# Patient Record
Sex: FEMALE | Race: WHITE | NOT HISPANIC OR LATINO | Employment: UNEMPLOYED | URBAN - METROPOLITAN AREA
[De-identification: names, ages, dates, MRNs, and addresses within clinical notes are randomized per-mention and may not be internally consistent; named-entity substitution may affect disease eponyms.]

---

## 2017-01-04 ENCOUNTER — OFFICE VISIT (OUTPATIENT)
Dept: FAMILY MEDICINE CLINIC | Facility: CLINIC | Age: 47
End: 2017-01-04

## 2017-01-04 ENCOUNTER — TELEPHONE (OUTPATIENT)
Dept: FAMILY MEDICINE CLINIC | Facility: CLINIC | Age: 47
End: 2017-01-04

## 2017-01-04 VITALS
HEART RATE: 84 BPM | DIASTOLIC BLOOD PRESSURE: 86 MMHG | SYSTOLIC BLOOD PRESSURE: 142 MMHG | BODY MASS INDEX: 32.14 KG/M2 | HEIGHT: 66 IN | WEIGHT: 200 LBS

## 2017-01-04 DIAGNOSIS — F32.89 OTHER DEPRESSION: ICD-10-CM

## 2017-01-04 DIAGNOSIS — M54.42 CHRONIC BILATERAL LOW BACK PAIN WITH LEFT-SIDED SCIATICA: ICD-10-CM

## 2017-01-04 DIAGNOSIS — M54.16 LUMBAR BACK PAIN WITH RADICULOPATHY AFFECTING LEFT LOWER EXTREMITY: Primary | ICD-10-CM

## 2017-01-04 DIAGNOSIS — J31.0 NONALLERGIC RHINITIS: ICD-10-CM

## 2017-01-04 DIAGNOSIS — G89.29 CHRONIC BILATERAL LOW BACK PAIN WITH LEFT-SIDED SCIATICA: ICD-10-CM

## 2017-01-04 DIAGNOSIS — M54.10 RADICULOPATHY OF LEG: ICD-10-CM

## 2017-01-04 DIAGNOSIS — I10 ESSENTIAL HYPERTENSION: ICD-10-CM

## 2017-01-04 PROCEDURE — 99214 OFFICE O/P EST MOD 30 MIN: CPT | Performed by: PHYSICIAN ASSISTANT

## 2017-01-04 RX ORDER — TIZANIDINE 4 MG/1
4 TABLET ORAL EVERY 8 HOURS PRN
Qty: 90 TABLET | Refills: 1 | Status: SHIPPED | OUTPATIENT
Start: 2017-01-04 | End: 2017-08-15

## 2017-01-04 RX ORDER — LOSARTAN POTASSIUM 50 MG/1
50 TABLET ORAL DAILY
Qty: 90 TABLET | Refills: 3 | Status: SHIPPED | OUTPATIENT
Start: 2017-01-04 | End: 2017-11-19 | Stop reason: SDUPTHER

## 2017-01-04 RX ORDER — FEXOFENADINE HCL 180 MG/1
180 TABLET ORAL DAILY
Qty: 30 TABLET | Refills: 5 | Status: SHIPPED | OUTPATIENT
Start: 2017-01-04 | End: 2017-09-27 | Stop reason: SDUPTHER

## 2017-01-04 RX ORDER — BUPROPION HYDROCHLORIDE 300 MG/1
300 TABLET ORAL EVERY MORNING
Qty: 90 TABLET | Refills: 3 | Status: SHIPPED | OUTPATIENT
Start: 2017-01-04 | End: 2017-11-19 | Stop reason: SDUPTHER

## 2017-01-04 RX ORDER — BUPROPION HYDROCHLORIDE 300 MG/1
300 TABLET ORAL EVERY MORNING
Qty: 90 TABLET | Refills: 3 | Status: SHIPPED | OUTPATIENT
Start: 2017-01-04 | End: 2017-01-04 | Stop reason: SDUPTHER

## 2017-01-04 RX ORDER — HYDROCHLOROTHIAZIDE 25 MG/1
TABLET ORAL
Qty: 60 TABLET | Refills: 5 | Status: SHIPPED | OUTPATIENT
Start: 2017-01-04 | End: 2017-01-04 | Stop reason: SDUPTHER

## 2017-01-04 RX ORDER — TIZANIDINE 4 MG/1
4 TABLET ORAL EVERY 8 HOURS PRN
Qty: 90 TABLET | Refills: 1 | Status: SHIPPED | OUTPATIENT
Start: 2017-01-04 | End: 2017-01-04 | Stop reason: SDUPTHER

## 2017-01-04 RX ORDER — GABAPENTIN 600 MG/1
TABLET ORAL
Qty: 360 TABLET | Refills: 3 | Status: SHIPPED | OUTPATIENT
Start: 2017-01-04 | End: 2017-01-04 | Stop reason: SDUPTHER

## 2017-01-04 RX ORDER — LOSARTAN POTASSIUM 50 MG/1
50 TABLET ORAL DAILY
Qty: 90 TABLET | Refills: 3 | Status: SHIPPED | OUTPATIENT
Start: 2017-01-04 | End: 2017-01-04 | Stop reason: SDUPTHER

## 2017-01-04 RX ORDER — GABAPENTIN 600 MG/1
TABLET ORAL
Qty: 360 TABLET | Refills: 3 | Status: SHIPPED | OUTPATIENT
Start: 2017-01-04 | End: 2021-07-29 | Stop reason: SDUPTHER

## 2017-01-04 RX ORDER — HYDROCHLOROTHIAZIDE 25 MG/1
TABLET ORAL
Qty: 180 TABLET | Refills: 3 | Status: SHIPPED | OUTPATIENT
Start: 2017-01-04 | End: 2017-11-19 | Stop reason: SDUPTHER

## 2017-01-04 RX ORDER — METHYLPREDNISOLONE 4 MG/1
TABLET ORAL
Qty: 1 EACH | Refills: 0 | Status: SHIPPED | OUTPATIENT
Start: 2017-01-04 | End: 2017-01-23

## 2017-01-04 NOTE — MR AVS SNAPSHOT
Violet Castañeda   1/4/2017 9:30 AM   Office Visit    Dept Phone:  [phone]   Encounter #:  [id number]    Provider:  Kianna Gandhi PA-C   Department:  [facility]                Your Full Care Plan              Today's Medication Changes          These changes are accurate as of: 1/4/17 10:36 AM.  If you have any questions, ask your nurse or doctor.               New Medication(s)Ordered:     MethylPREDNISolone 4 MG tablet   Commonly known as:  MEDROL (TANA)   Take as directed on package instructions.   Started by:  Kianna Gandhi PA-C         Medication(s)that have changed:     buPROPion  MG 24 hr tablet   Commonly known as:  WELLBUTRIN XL   Take 1 tablet by mouth Every Morning.   What changed:    - how much to take  - how to take this  - when to take this   Changed by:  Kianna Gandhi PA-C       losartan 50 MG tablet   Commonly known as:  COZAAR   Take 1 tablet by mouth Daily.   What changed:    - how much to take  - how to take this  - when to take this   Changed by:  Kianna Gandhi PA-C       tiZANidine 4 MG tablet   Commonly known as:  ZANAFLEX   Take 1 tablet by mouth Every 8 (Eight) Hours As Needed for muscle spasms.   What changed:    - how much to take  - how to take this  - when to take this  - reasons to take this   Changed by:  Kianna Gandhi PA-C         Stop taking medication(s)listed here:     HYDROcodone-acetaminophen 5-325 MG per tablet   Commonly known as:  NORCO   Stopped by:  Kianna Gandhi PA-C           levoFLOXacin 500 MG tablet   Commonly known as:  LEVAQUIN   Stopped by:  Kianna Gandhi PA-C                Where to Get Your Medications      These medications were sent to [facility] - [place], [place] - [address] [place] [address] - [phone] [facility] [phone] [address] [address]     Phone:  [phone]     buPROPion  MG 24 hr tablet    gabapentin 600 MG tablet    hydrochlorothiazide 25 MG tablet    losartan 50 MG tablet    sertraline 50 MG tablet    tiZANidine 4 MG tablet         These medications were sent to [facility] - [place], [place] - [address] [facility] [address] - [phone] PH - [phone] FX  [address], [facility] [address]     Phone:  [phone]     MethylPREDNISolone 4 MG tablet                  Your Updated Medication List          This list is accurate as of: 1/4/17 10:36 AM.  Always use your most recent med list.                ALPRAZolam 0.5 MG tablet   Commonly known as:  XANAX       buPROPion  MG 24 hr tablet   Commonly known as:  WELLBUTRIN XL   Take 1 tablet by mouth Every Morning.       gabapentin 600 MG tablet   Commonly known as:  NEURONTIN   Take one every 6 hours for leg pain       hydrochlorothiazide 25 MG tablet   Commonly known as:  HYDRODIURIL   Take 2 daily       losartan 50 MG tablet   Commonly known as:  COZAAR   Take 1 tablet by mouth Daily.       MethylPREDNISolone 4 MG tablet   Commonly known as:  MEDROL (TANA)   Take as directed on package instructions.       ondansetron 8 MG tablet   Commonly known as:  ZOFRAN       sertraline 50 MG tablet   Commonly known as:  ZOLOFT   Take 2 daily       tiZANidine 4 MG tablet   Commonly known as:  ZANAFLEX   Take 1 tablet by mouth Every 8 (Eight) Hours As Needed for muscle spasms.               You Were Diagnosed With        Codes Comments    Lumbar back pain with radiculopathy affecting left lower extremity    -  Primary ICD-10-CM: M54.17  ICD-9-CM: 724.4     Other depression     ICD-10-CM: F32.89     Essential hypertension     ICD-10-CM: I10  ICD-9-CM: 401.9     Chronic bilateral low back pain with left-sided sciatica     ICD-10-CM: M54.42, G89.29  ICD-9-CM: 724.2, 724.3, 338.29     Radiculopathy of leg     ICD-10-CM: M54.10  ICD-9-CM: 724.4       Instructions     None    Patient Instructions History      Upcoming Appointments      "Visit Type Date Time Department    OFFICE VISIT 2017  9:30 AM [facility]      [facility]enn[facility] Signup     [facility] allows you to send messages to your doctor, view your test results, renew your prescriptions, schedule appointments, and more. To sign up, go to [employer] and click on the Sign Up Now link in the New User? box. Enter your [facility] Activation Code exactly as it appears below along with the last four digits of your Social Security Number and your Date of Birth () to complete the sign-up process. If you do not sign up before the expiration date, you must request a new code.    Vennli Activation Code: [email or web address]  Expires: 2017  5:36 AM    If you have questions, you can email [email or web address] or call [phone] to talk to our [facility] staff. Remember, [facility] is NOT to be used for urgent needs. For medical emergencies, dial 911.               Other Info from Your Visit           Allergies     Penicillins  Rash      Reason for Visit     Back Pain           Vital Signs     Blood Pressure Pulse Height Weight Last Menstrual Period Body Mass Index    142/86 (BP Location: Left arm, Patient Position: Sitting, Cuff Size: Adult) 84 66\" (167.6 cm) 200 lb (90.7 kg) (Approximate) 32.28 kg/m2    Smoking Status                   Former Smoker           Problems and Diagnoses Noted     Lumbar back pain with radiculopathy affecting left lower extremity    -  Primary    Other depression        High blood pressure        Chronic bilateral low back pain with left-sided sciatica        Radiculopathy of leg            "

## 2017-01-04 NOTE — TELEPHONE ENCOUNTER
Have it changed to Allegra 180 mg take one daily for postnasal drainage dispensed #30 with 5 refills.

## 2017-01-04 NOTE — TELEPHONE ENCOUNTER
Pharmacy called and asked if there is another medication that the pt can use besides the Chlorcyclizine-Pseudoephed, bc they dont sell it?

## 2017-01-04 NOTE — PROGRESS NOTES
Subjective   Violet Castañeda is a 46 y.o. female    History of Present Illness  Patient presents today for evaluation of acute back pain.  Patient states that 2 days ago she bent over to  a lightweight object limitedly filter back locked up and couldn't stand up straight.  She states she doesn't have any numbness but she does have weakness and pain radiating down her entire left leg.  Pain starts at her mid low back more on the left than the right.  She states her pain is a 10 out of 10.  She was unable to walk or get out of bed on her own until she started on some steroids that a friend dispensed to her 2 days ago.  She is currently on 2400 mg of gabapentin daily for chronic neuropathy which has been stable.  She's not had an MRI of her back yet.  She states if she stands in one position or worsens.  She denies any  or GI complaints.  She's continued to have some chronic postnasal drainage associated with her nonallergic rhinitis request of her prescription for something for this as well.  Additionally patient needs follow-up on depression and hypertension and needs med refills on these medications, both these conditions are stable at this time.  The following portions of the patient's history were reviewed and updated as appropriate: allergies, current medications, past social history and problem list    Review of Systems   Constitutional: Negative.  Negative for fatigue and unexpected weight change.   HENT: Positive for congestion and postnasal drip.    Respiratory: Negative.  Negative for cough, chest tightness and shortness of breath.    Cardiovascular: Negative for chest pain, palpitations and leg swelling.   Gastrointestinal: Negative.  Negative for nausea.   Musculoskeletal: Positive for back pain. Negative for arthralgias, gait problem and myalgias.   Skin: Negative for color change and rash.   Neurological: Negative for dizziness, tremors, syncope, weakness, numbness and headaches.    Psychiatric/Behavioral: Negative for behavioral problems and dysphoric mood. The patient is not nervous/anxious.        Objective     Vitals:    01/04/17 1009   BP: 142/86   Pulse: 84       Physical Exam   Constitutional: She is oriented to person, place, and time. She appears well-developed and well-nourished.   HENT:   Head: Normocephalic and atraumatic.   Nose: Nose normal.   Neck: No JVD present. No thyroid mass and no thyromegaly present.   Cardiovascular: Normal rate, regular rhythm, normal heart sounds, intact distal pulses and normal pulses.    No murmur heard.  Pulmonary/Chest: Effort normal and breath sounds normal. No respiratory distress.   Abdominal: Soft. Bowel sounds are normal. There is no hepatosplenomegaly. There is no tenderness.   Musculoskeletal: She exhibits tenderness (pain left lower extremity with straight leg raising pain and lumbar spine with full flexion.). She exhibits no edema or deformity.        Lumbar back: She exhibits decreased range of motion, tenderness, bony tenderness and pain. She exhibits no swelling, no deformity and no spasm.   Gait is antalgic, having difficulty standing from a seated position without assistance.  No foot drop noted.  Marked decrease range of motion of lumbar spine, flexion limited to 30°.   Neurological: She is alert and oriented to person, place, and time. She displays abnormal reflex (DTRs absent left ankle, positive straight leg raise left lower extremity).   Skin: Skin is warm and dry.   Psychiatric: Her speech is normal and behavior is normal. Judgment and thought content normal. Her mood appears anxious. Her affect is not angry and not inappropriate. Cognition and memory are normal. She does not exhibit a depressed mood. She is attentive.   Nursing note and vitals reviewed.      Assessment/Plan     Diagnoses and all orders for this visit:    Lumbar back pain with radiculopathy affecting left lower extremity  -     MethylPREDNISolone (MEDROL, TANA,)  4 MG tablet; Take as directed on package instructions.  -     MRI Lumbar Spine Without Contrast; Future    Other depression  -     Discontinue: sertraline (ZOLOFT) 50 MG tablet; Take 2 daily  -     sertraline (ZOLOFT) 50 MG tablet; Take 2 daily    Essential hypertension  -     Discontinue: losartan (COZAAR) 50 MG tablet; Take 1 tablet by mouth Daily.  -     Discontinue: hydrochlorothiazide (HYDRODIURIL) 25 MG tablet; Take 2 daily  -     losartan (COZAAR) 50 MG tablet; Take 1 tablet by mouth Daily.  -     hydrochlorothiazide (HYDRODIURIL) 25 MG tablet; Take 2 daily    Chronic bilateral low back pain with left-sided sciatica  -     Discontinue: gabapentin (NEURONTIN) 600 MG tablet; Take one every 6 hours for leg pain  -     gabapentin (NEURONTIN) 600 MG tablet; Take one every 6 hours for leg pain    Radiculopathy of leg  -     Discontinue: gabapentin (NEURONTIN) 600 MG tablet; Take one every 6 hours for leg pain  -     gabapentin (NEURONTIN) 600 MG tablet; Take one every 6 hours for leg pain    Nonallergic rhinitis    Other orders  -     Discontinue: tiZANidine (ZANAFLEX) 4 MG tablet; Take 1 tablet by mouth Every 8 (Eight) Hours As Needed for muscle spasms.  -     Discontinue: buPROPion XL (WELLBUTRIN XL) 300 MG 24 hr tablet; Take 1 tablet by mouth Every Morning.  -     tiZANidine (ZANAFLEX) 4 MG tablet; Take 1 tablet by mouth Every 8 (Eight) Hours As Needed for muscle spasms.  -     buPROPion XL (WELLBUTRIN XL) 300 MG 24 hr tablet; Take 1 tablet by mouth Every Morning.  -     Discontinue: Chlorcyclizine-Pseudoephed 25-60 MG tablet; Take 25 mg by mouth 2 (Two) Times a Day. For congestion

## 2017-01-10 ENCOUNTER — TELEPHONE (OUTPATIENT)
Dept: FAMILY MEDICINE CLINIC | Facility: CLINIC | Age: 47
End: 2017-01-10

## 2017-01-10 NOTE — TELEPHONE ENCOUNTER
Bonnie, this is unacceptable to me.  Her notes clearly show that she is having absence of reflexes, neurological signs consistent with nerve damage from the spine, an MRI is the only acceptable test for this.  Please find out who I need to personally s  peak to about a peer to peer review on this.

## 2017-01-10 NOTE — TELEPHONE ENCOUNTER
----- Message from Bonnie Cuello sent at 1/10/2017  8:45 AM EST -----  Patient's MRI of Lumbar spine got reviewed and denied. I don't know what Kianna will want to do?? Maybe an xray??  ThanksBonnie..

## 2017-01-13 ENCOUNTER — HOSPITAL ENCOUNTER (OUTPATIENT)
Dept: MRI IMAGING | Facility: HOSPITAL | Age: 47
Discharge: HOME | End: 2017-01-13
Admitting: PHYSICIAN ASSISTANT

## 2017-01-13 DIAGNOSIS — M54.16 LUMBAR BACK PAIN WITH RADICULOPATHY AFFECTING LEFT LOWER EXTREMITY: ICD-10-CM

## 2017-01-13 PROCEDURE — 72148 MRI LUMBAR SPINE W/O DYE: CPT

## 2017-01-16 ENCOUNTER — TELEPHONE (OUTPATIENT)
Dept: FAMILY MEDICINE CLINIC | Facility: CLINIC | Age: 47
End: 2017-01-16

## 2017-01-16 DIAGNOSIS — G89.29 CHRONIC LOW BACK PAIN WITH SCIATICA, SCIATICA LATERALITY UNSPECIFIED, UNSPECIFIED BACK PAIN LATERALITY: Primary | ICD-10-CM

## 2017-01-16 DIAGNOSIS — M54.40 CHRONIC LOW BACK PAIN WITH SCIATICA, SCIATICA LATERALITY UNSPECIFIED, UNSPECIFIED BACK PAIN LATERALITY: Primary | ICD-10-CM

## 2017-01-16 NOTE — TELEPHONE ENCOUNTER
----- Message from Kianna Gandhi PA-C sent at 1/16/2017  7:21 AM EST -----  Please contact patient and notify her that her MRI does not show herniated disc and does not show any nerve damage.  It does show significant arthritis and degenerative changes.  If she is continuing to have pain I would like to have her see a pain m  anagement specialist to try a spinal injection.

## 2017-01-23 ENCOUNTER — TELEPHONE (OUTPATIENT)
Dept: PAIN MEDICINE | Facility: CLINIC | Age: 47
End: 2017-01-23

## 2017-01-23 NOTE — TELEPHONE ENCOUNTER
I have reviewed [facility] Report #[id number] consistent to medication reconciliation.

## 2017-01-31 ENCOUNTER — OFFICE VISIT (OUTPATIENT)
Dept: PAIN MEDICINE | Facility: CLINIC | Age: 47
End: 2017-01-31

## 2017-01-31 VITALS
TEMPERATURE: 97.8 F | HEART RATE: 87 BPM | RESPIRATION RATE: 18 BRPM | SYSTOLIC BLOOD PRESSURE: 150 MMHG | BODY MASS INDEX: 33.04 KG/M2 | DIASTOLIC BLOOD PRESSURE: 90 MMHG | WEIGHT: 205.6 LBS | OXYGEN SATURATION: 97 % | HEIGHT: 66 IN

## 2017-01-31 DIAGNOSIS — M70.62 GREATER TROCHANTERIC BURSITIS OF LEFT HIP: ICD-10-CM

## 2017-01-31 DIAGNOSIS — R53.81 PHYSICAL DECONDITIONING: ICD-10-CM

## 2017-01-31 DIAGNOSIS — F32.A ANXIETY AND DEPRESSION: ICD-10-CM

## 2017-01-31 DIAGNOSIS — F41.9 ANXIETY AND DEPRESSION: ICD-10-CM

## 2017-01-31 DIAGNOSIS — G57.02 PIRIFORMIS SYNDROME OF LEFT SIDE: ICD-10-CM

## 2017-01-31 DIAGNOSIS — M53.3 SACROILIAC JOINT DYSFUNCTION OF LEFT SIDE: ICD-10-CM

## 2017-01-31 DIAGNOSIS — M51.27 DISPLACEMENT OF INTERVERTEBRAL DISC OF LUMBOSACRAL REGION: ICD-10-CM

## 2017-01-31 DIAGNOSIS — E66.9 MILD OBESITY: ICD-10-CM

## 2017-01-31 PROCEDURE — 99203 OFFICE O/P NEW LOW 30 MIN: CPT | Performed by: ANESTHESIOLOGY

## 2017-01-31 NOTE — PROGRESS NOTES
"Chief Complaint: \"Pain in my left hip and tailbone\"    History of Present Illness:   Patient: Ms. Violet Castañeda, 46 y.o. female   Referring physician: Dr. Violet Gandhi PA-C  Reason for referral: Consultation for intractable chronic lower back and hp pain, which started one year ago.  Patient reports than on 01/04/2017, after bending over to  a light weight object, she experienced sudden increase of lower back pain.  Pain has progressed in intensity since then.  Pain description: constant pain with intermittent exacerbation, described as aching, sharp and stabbing sensation.   Radiation of pain: The lower back pain radiates into the lateral aspect of the hips and lateral aspect of the thigh.  Pain intensity today: 6/10  Average pain intensity last week: 7/10  Pain intensity ranges from: 4/10 to 8/10  Aggravating factors: Pain increases with sitting longer than 15 minutes and ambulating more than 30 minutes.  Alleviating factors: Pain decreases with walking around for a few minutes and changing positions.   Associated symptoms:   Patient denies  numbness and weakness in the lower extremities.   Patient denies  any new bladder or bowel problems.   Patient denies  difficulties with her balance.     Review of previous therapies and additional medical records:  Violet Castañeda has already failed the following measures, including:   Conservative measures: oral analgesics, opioids, topical analgesics, massage, physical therapy, ice and heat   Interventional measures: None  Surgical measures: No previous spine surgery  Violet Castañeda presents with significant comorbidities including axiety and depression,  engaged in treatment.  In terms of current analgesics, Violet Castañeda takes: Diclofenac, gabapentin, tizanidine.  I have reviewed [facility] Report #[id number] consistent to medication reconciliation.    Review of diagnostic Studies:    MRI LUMBAR SPINE WITHOUT CONTRAST-01/13/2017: " I have reviewed the images of her MRI.  The lumbar vertebral bodies are normally aligned. There is normal marrow signal.  From L1-L2 through L4-L5 normal disc morphology and normal signal. L5-S1: Modic-type I reactive endplate degenerative change, disc desiccation, disc protrusion. There is no spinal stenosis. The conus medullaris is slightly low lying at the level of L1 but there is no tethered cord. There is no spinal stenosis.      Review of Systems   Constitutional: Positive for fatigue.   Musculoskeletal: Positive for back pain.   Neurological: Positive for headaches.   Hematological: Bruises/bleeds easily.   Psychiatric/Behavioral: Positive for sleep disturbance. The patient is nervous/anxious (depression).    All other systems reviewed and are negative.     Patient Active Problem List   Diagnosis   • Sacroiliac joint dysfunction of left side   • Displacement of intervertebral disc of lumbosacral region   • Piriformis syndrome of left side   • Greater trochanteric bursitis of left hip   • Mild obesity   • Anxiety and depression   • Physical deconditioning       Past Medical History   Diagnosis Date   • Arthritis    • Depression    • Extremity pain    • Headache, tension-type    • Joint pain    • Low back pain    • Lumbosacral disc disease          Past Surgical History   Procedure Laterality Date   • Hysterectomy  1998   • Rotator cuff repair     • Appendectomy           Family History   Problem Relation Age of Onset   • Heart disease Mother    • Hypertension Mother    • Cancer Father    • Hypertension Father    • Breast cancer Neg Hx    • Ovarian cancer Neg Hx          Social History     Social History   • Marital status:      Spouse name: N/A   • Number of children: N/A   • Years of education: N/A     Social History Main Topics   • Smoking status: Former Smoker   • Smokeless tobacco: Never Used   • Alcohol use Yes      Comment: occ.   • Drug use: No   • Sexual activity: Not Asked     Other Topics  "Concern   • None     Social History Narrative           Current Outpatient Prescriptions:   •  ALPRAZolam (XANAX) 0.5 MG tablet, , Disp: , Rfl:   •  buPROPion XL (WELLBUTRIN XL) 300 MG 24 hr tablet, Take 1 tablet by mouth Every Morning., Disp: 90 tablet, Rfl: 3  •  fexofenadine (ALLEGRA ALLERGY) 180 MG tablet, Take 1 tablet by mouth Daily., Disp: 30 tablet, Rfl: 5  •  gabapentin (NEURONTIN) 600 MG tablet, Take one every 6 hours for leg pain, Disp: 360 tablet, Rfl: 3  •  hydrochlorothiazide (HYDRODIURIL) 25 MG tablet, Take 2 daily, Disp: 180 tablet, Rfl: 3  •  losartan (COZAAR) 50 MG tablet, Take 1 tablet by mouth Daily., Disp: 90 tablet, Rfl: 3  •  ondansetron (ZOFRAN) 8 MG tablet, , Disp: , Rfl:   •  tiZANidine (ZANAFLEX) 4 MG tablet, Take 1 tablet by mouth Every 8 (Eight) Hours As Needed for muscle spasms., Disp: 90 tablet, Rfl: 1  •  diclofenac (FLECTOR) 1.3 % patch patch, Apply 1 patch topically 2 (Two) Times a Day., Disp: 60 patch, Rfl: 3      Allergies   Allergen Reactions   • Penicillins Rash         Visit Vitals   • /90 (BP Location: Left arm, Patient Position: Sitting)   • Pulse 87   • Temp 97.8 °F (36.6 °C) (Temporal Artery )   • Resp 18   • Ht 66\" (167.6 cm)   • Wt 205 lb 9.6 oz (93.3 kg)   • LMP  (Approximate)   • SpO2 97%   • BMI 33.18 kg/m2         Physical Exam:  Constitutional: Patient is oriented to person, place, and time. Vital signs are normal. Patient appears well-developed and well-nourished.   HENT: Head: Normocephalic and atraumatic. Eyes: Conjunctivae and lids are normal. Pupils: Equal, round, reactive to light.   Neck: Trachea normal. Neck supple. No JVD present.   Pulmonary Respiratory effort: No increased work of breathing or signs of respiratory distress. Auscultation of lungs: Clear to auscultation.   Cardiovascular Auscultation of heart: Normal rate and rhythm, normal S1 and S2, no murmurs.   Peripheral vascular exam: Normal.No edema.   Abdomen: The abdomen was soft and " nontender. Bowel sounds were normal.   Musculoskeletal   Gait and station: Gait evaluation demonstrated a normal gait.   Lumbar spine: The range of motion of the lumbar spine is full and without pain. Extension, flexion, lateral flexion, rotation of the lumbar spine did not increase or reproduce pain. Lumbar facet joint loading maneuvers are negative.   Abiodun and Gaenslen's tests are negative on the right, positive on the left   Piriformis maneuvers are negative on the right, positive on the left   Palpation of the bilateral ischial tuberosities, reveals no tenderness   Palpation of the bilateral greater trochanter, reveals tenderness on the left  Examination of the Iliotibial band: reveals tenderness on the left   Hip joints: The range of motion of the hip joints is full and without pain   Neurological: Patient is alert and oriented to person, place, and time. Speech: speech is normal. Cortical function: Normal mental status.   Cranial nerves: Cranial nerves 2-12 intact.   Reflex Scores:  Right patellar: 1+  Left patellar: 1+  Right achilles: 1+  Left achilles: 1+  Motor strength: 5/5  Motor Tone: normal tone.   Involuntary movements: none.   Superficial/Primitive Reflexes: primitive reflexes were absent.   Right Connors: absent  Left Connors: absent  Right ankle clonus: absent  Left ankle clonus: absent   No nuchal rigidity. Negative Kernig and Brudzinski.  Spurling sign is negative. Lhermitte sign is negative. Negative long tract signs. Straight leg raising test is negative. Femoral stretch sign is negative.   Sensation: No sensory loss. Sensory exam: intact to light touch, intact pain and temperature sensation, intact vibration sensation and normal proprioception.   Coordination: Normal finger to nose and heel to shin. Normal balance and negative. Romberg's sign negative.   Skin and subcutaneous tissue: Skin is warm and intact. No rash noted. No cyanosis.   Psychiatric:   Judgment and insight: Normal.    Orientation to person, place and time: Normal.   Recent and remote memory: Intact.   Mood and affect: Normal.     ASSESSMENT:   1. Sacroiliac joint dysfunction of left side    2. Piriformis syndrome of left side    3. Greater trochanteric bursitis of left hip    4. Displacement of intervertebral disc of lumbosacral region    5. Mild obesity    6. Anxiety and depression    7. Physical deconditioning      PLAN/MEDICAL DECISION MAKING: I had a lengthy conversation with Ms. Violet Castañeda regarding her chronic pain condition and potential therapeutic options including risks, benefits, alternative therapies, to name a few. Patient has failed to obtain pain relief with conservative measures, as referenced above.  I have reviewed all available patient's medical records as well as previous therapies as referenced above.  Therefore, I have proposed the following plan:  1. Long-term rehabilitation efforts:  A. Patient will start a comprehensive physical therapy program for water therapy, core strengthening, gait and balance training, neurodynamics, ultrasound, myofascial release, cupping and dry needling once pain is under control  B. Start an exercise program such as yoga, Pilates and water therapy  C. Referral to Dr. Katt Louis for cognitive behavioral therapy, biofeedback, assistance in the development of effective coping skills and sleep hygiene.  D. Referral to [facility]  2. Patient has been screened for tobacco use: Current tobacco non-user  3. Pharmacological measures:  A. Continue gabapentin 600 mg 3 times a day, as currently prescribed  B. Continue Motrin 600 mg 3 times a day, as currently prescribed  C. Continue tizanidine as currently prescribed  D. Trial with Flector patches   4. Interventional pain management measures: Patient will be scheduled for diagnostic left sacroiliac joint injection and diagnostic left piriformis muscle injection under peripheral nerve  stimulation and ultrasound guidance.  If patient experiences complete pain relief, then, we will move forward with left sacroiliac joint injection with steroids and left piriformis muscle injection with steroids.  Otherwise, we will proceed with diagnostic and therapeutic left L5-S1 and left S1 transforaminal epidural steroid injections.  If patient continues struggling with pain, then, I will obtain provocative lumbar discography to clarify the origin of her pain versus CT myelogram, or refer her for neurosurgical consultation.  5. The patient has been instructed to contact my office with any questions or difficulties. The patient understands the plan and agrees to proceed accordingly.       Patient Care Team:  Kianna Gandhi PA-C as PCP - General (Family Medicine)     No orders of the defined types were placed in this encounter.        Future Appointments  Date Time Provider Department Center   2/15/2017 10:00 AM Wallace Duran MD [facility] None         Wallace Duran MD     EMR Dragon/Transcription disclaimer:  Much of this encounter note is an electronic transcription of spoken language to printed text. Electronic transcription of spoken language may permit erroneous, or at times, nonsensical words or phrases to be inadvertently transcribed. Although I have reviewed the note for such errors, some may still exist.

## 2017-01-31 NOTE — MR AVS SNAPSHOT
[facility] PAIN MANAGEMENT  [phone]                    Violet Castañeda   1/31/2017 9:00 AM   Office Visit    Dept Phone:  [phone]   Encounter #:  [id number]    Provider:  Wallace Duran MD   Department:  [facility] PAIN MANAGEMENT                Your Full Care Plan              Today's Medication Changes          These changes are accurate as of: 1/31/17 11:35 AM.  If you have any questions, ask your nurse or doctor.               Stop taking medication(s)listed here:     sertraline 50 MG tablet   Commonly known as:  ZOLOFT   Stopped by:  Wallace Duran MD                      Your Updated Medication List          This list is accurate as of: 1/31/17 11:35 AM.  Always use your most recent med list.                ALPRAZolam 0.5 MG tablet   Commonly known as:  XANAX       buPROPion  MG 24 hr tablet   Commonly known as:  WELLBUTRIN XL   Take 1 tablet by mouth Every Morning.       fexofenadine 180 MG tablet   Commonly known as:  ALLEGRA ALLERGY   Take 1 tablet by mouth Daily.       gabapentin 600 MG tablet   Commonly known as:  NEURONTIN   Take one every 6 hours for leg pain       hydrochlorothiazide 25 MG tablet   Commonly known as:  HYDRODIURIL   Take 2 daily       losartan 50 MG tablet   Commonly known as:  COZAAR   Take 1 tablet by mouth Daily.       ondansetron 8 MG tablet   Commonly known as:  ZOFRAN       tiZANidine 4 MG tablet   Commonly known as:  ZANAFLEX   Take 1 tablet by mouth Every 8 (Eight) Hours As Needed for muscle spasms.               Instructions     None    Patient Instructions History      Upcoming Appointments     Visit Type Date Time Department    NEW PATIENT 1/31/2017  9:00 AM [facility]    OUTSIDE FACILITY 2/15/2017 10:00 AM [facility]      YouRenew Signup     Our records indicate that you have an active [facility] YouRenew account.    You can view your After Visit Summary by going to [employer]  "and logging in with your [employer] username and password.  If you don't have a [employer] username and password but a parent or guardian has access to your record, the parent or guardian should login with their own [employer] username and password and access your record to view the After Visit Summary.    If you have questions, you can email [email or web address] or call [phone] to talk to our [employer] staff.  Remember, [employer] is NOT to be used for urgent needs.  For medical emergencies, dial 911.               Other Info from Your Visit           Your Appointments     Feb 15, 2017 10:00 AM EST   Outside Facility with Wallace Duran MD   [facility] (--)    [id number] ,  [address] [address] [phone]              Allergies     Penicillins  Rash      Reason for Visit     Back Pain     Hip Pain left hip      Vital Signs     Blood Pressure Pulse Temperature Respirations Height Weight    150/90 (BP Location: Left arm, Patient Position: Sitting) 87 97.8 °F (36.6 °C) (Temporal Artery ) 18 66\" (167.6 cm) 205 lb 9.6 oz (93.3 kg)    Last Menstrual Period Oxygen Saturation Body Mass Index Smoking Status          (Approximate) 97% 33.18 kg/m2 Former Smoker          "

## 2017-02-13 ENCOUNTER — OFFICE VISIT (OUTPATIENT)
Dept: FAMILY MEDICINE CLINIC | Facility: CLINIC | Age: 47
End: 2017-02-13

## 2017-02-13 VITALS
SYSTOLIC BLOOD PRESSURE: 140 MMHG | BODY MASS INDEX: 32.62 KG/M2 | HEIGHT: 66 IN | WEIGHT: 203 LBS | TEMPERATURE: 99.5 F | HEART RATE: 94 BPM | DIASTOLIC BLOOD PRESSURE: 92 MMHG | OXYGEN SATURATION: 98 %

## 2017-02-13 DIAGNOSIS — J02.9 SORE THROAT: ICD-10-CM

## 2017-02-13 DIAGNOSIS — R68.89 FLU-LIKE SYMPTOMS: Primary | ICD-10-CM

## 2017-02-13 LAB
EXPIRATION DATE: NORMAL
EXPIRATION DATE: NORMAL
FLUAV AG NPH QL: NEGATIVE
FLUBV AG NPH QL: NEGATIVE
INTERNAL CONTROL: NORMAL
INTERNAL CONTROL: NORMAL
Lab: NORMAL
Lab: NORMAL
S PYO AG THROAT QL: NEGATIVE

## 2017-02-13 PROCEDURE — 87880 STREP A ASSAY W/OPTIC: CPT | Performed by: PHYSICIAN ASSISTANT

## 2017-02-13 PROCEDURE — 87804 INFLUENZA ASSAY W/OPTIC: CPT | Performed by: PHYSICIAN ASSISTANT

## 2017-02-13 PROCEDURE — 99213 OFFICE O/P EST LOW 20 MIN: CPT | Performed by: PHYSICIAN ASSISTANT

## 2017-02-13 RX ORDER — OSELTAMIVIR PHOSPHATE 75 MG/1
75 CAPSULE ORAL 2 TIMES DAILY
Qty: 10 CAPSULE | Refills: 0 | Status: SHIPPED | OUTPATIENT
Start: 2017-02-13 | End: 2017-03-17

## 2017-02-13 RX ORDER — DEXTROMETHORPHAN HYDROBROMIDE AND PROMETHAZINE HYDROCHLORIDE 15; 6.25 MG/5ML; MG/5ML
5 SYRUP ORAL 4 TIMES DAILY PRN
Qty: 180 ML | Refills: 0 | Status: SHIPPED | OUTPATIENT
Start: 2017-02-13 | End: 2017-06-12

## 2017-02-13 NOTE — PROGRESS NOTES
Subjective   Violet Castañeda is a 46 y.o. female    History of Present Illness  Patient presents today feeling sick since yesterday.  She states she has a sore throat, terrible body aches, fever and chills.  She started coughing last night with very dry cough that she states that her chest felt tight when she was laying in bed last night.  She has a headache as well.  She feels fatigued.  She has a relative with the flu as well as another relative with strep throat in 1 with pneumonia.  Patient did not take a flu shot.  The following portions of the patient's history were reviewed and updated as appropriate: allergies, current medications, past social history and problem list    Review of Systems   Constitutional: Positive for chills, diaphoresis, fatigue and fever.   HENT: Positive for congestion, postnasal drip, rhinorrhea, sneezing, sore throat and voice change. Negative for sinus pressure.    Respiratory: Positive for cough.    Musculoskeletal: Positive for myalgias.   Neurological: Positive for headaches.       Objective     Vitals:    02/13/17 1516   BP: 140/92   Pulse: 94   Temp: 99.5 °F (37.5 °C)   SpO2: 98%       Physical Exam   Constitutional: She appears well-developed and well-nourished. She appears lethargic.  Non-toxic appearance. She has a sickly appearance. No distress.   HENT:   Head: Normocephalic and atraumatic.   Right Ear: External ear normal.   Left Ear: External ear normal.   Nose: Nose normal.   Mouth/Throat: Oropharynx is clear and moist. No oropharyngeal exudate.   Eyes: Conjunctivae are normal. Right eye exhibits no discharge. Left eye exhibits no discharge.   Neck: Normal range of motion. Neck supple.   Cardiovascular: Normal rate, regular rhythm and normal heart sounds.    Pulmonary/Chest: Effort normal and breath sounds normal. No respiratory distress.   Lymphadenopathy:     She has no cervical adenopathy.   Neurological: She appears lethargic.   Skin: Skin is warm and dry. She  is not diaphoretic.   Nursing note and vitals reviewed.   rapid flu testing negative.  Discussed with patient.  Symptoms are clinically very flulike.  Therefore we're going to go ahead and treat her for the flu.  She will notify me if she is not seeing improvement in the next 48 hours.  She'll follow up sooner symptoms worsen.    Assessment/Plan     Diagnoses and all orders for this visit:    Flu-like symptoms  -     POCT Influenza A/B  -     oseltamivir (TAMIFLU) 75 MG capsule; Take 1 capsule by mouth 2 (Two) Times a Day.  -     promethazine-dextromethorphan (PROMETHAZINE-DM) 6.25-15 MG/5ML syrup; Take 5 mL by mouth 4 (Four) Times a Day As Needed for cough.    Sore throat  -     POCT rapid strep A  -     oseltamivir (TAMIFLU) 75 MG capsule; Take 1 capsule by mouth 2 (Two) Times a Day.  -     promethazine-dextromethorphan (PROMETHAZINE-DM) 6.25-15 MG/5ML syrup; Take 5 mL by mouth 4 (Four) Times a Day As Needed for cough.

## 2017-02-22 ENCOUNTER — OUTSIDE FACILITY SERVICE (OUTPATIENT)
Dept: PAIN MEDICINE | Facility: CLINIC | Age: 47
End: 2017-02-22

## 2017-02-22 PROCEDURE — 99152 MOD SED SAME PHYS/QHP 5/>YRS: CPT | Performed by: ANESTHESIOLOGY

## 2017-02-22 PROCEDURE — 20550 NJX 1 TENDON SHEATH/LIGAMENT: CPT | Performed by: ANESTHESIOLOGY

## 2017-02-22 PROCEDURE — 27096 INJECT SACROILIAC JOINT: CPT | Performed by: ANESTHESIOLOGY

## 2017-03-17 ENCOUNTER — OFFICE VISIT (OUTPATIENT)
Dept: FAMILY MEDICINE CLINIC | Facility: CLINIC | Age: 47
End: 2017-03-17

## 2017-03-17 VITALS
SYSTOLIC BLOOD PRESSURE: 142 MMHG | HEART RATE: 82 BPM | BODY MASS INDEX: 33.27 KG/M2 | OXYGEN SATURATION: 98 % | DIASTOLIC BLOOD PRESSURE: 96 MMHG | TEMPERATURE: 98.1 F | HEIGHT: 66 IN | WEIGHT: 207 LBS

## 2017-03-17 DIAGNOSIS — E66.9 OBESITY (BMI 30-39.9): ICD-10-CM

## 2017-03-17 DIAGNOSIS — G89.29 CHRONIC BILATERAL LOW BACK PAIN WITH LEFT-SIDED SCIATICA: ICD-10-CM

## 2017-03-17 DIAGNOSIS — M54.16 LUMBAR BACK PAIN WITH RADICULOPATHY AFFECTING LEFT LOWER EXTREMITY: Primary | ICD-10-CM

## 2017-03-17 DIAGNOSIS — M54.42 CHRONIC BILATERAL LOW BACK PAIN WITH LEFT-SIDED SCIATICA: ICD-10-CM

## 2017-03-17 PROCEDURE — 99213 OFFICE O/P EST LOW 20 MIN: CPT | Performed by: PHYSICIAN ASSISTANT

## 2017-03-17 RX ORDER — TOPIRAMATE 25 MG/1
25 TABLET ORAL 2 TIMES DAILY
Qty: 60 TABLET | Refills: 1 | Status: SHIPPED | OUTPATIENT
Start: 2017-03-17 | End: 2017-04-11 | Stop reason: SDUPTHER

## 2017-03-17 NOTE — PROGRESS NOTES
Subjective   Violet Castañeda is a 46 y.o. female    History of Present Illness  Patient presents today to discuss her ongoing back pain.  She received one injection in her spine she states that it helped clear it up by about 95% for 2 weeks then the pain returned.  She was to discuss the recommendations given to her pain management doctor.  She states that she is frustrated about not being able to lose weight.  She realizes that her weight is adding to her back problem.  She states that she is unable to be very active because of her back and this is causing more difficulty losing weight.  She is trying to follow a diet low in simple carbohydrates and starches and simple sugars.  She is trying to decrease her overall portion sizes.  The following portions of the patient's history were reviewed and updated as appropriate: allergies, current medications, past social history and problem list    Review of Systems   Constitutional: Negative.  Negative for activity change, appetite change and unexpected weight change.   Respiratory: Negative.    Cardiovascular: Negative for chest pain.   Gastrointestinal: Negative.  Negative for abdominal distention, abdominal pain, diarrhea and nausea.   Musculoskeletal: Positive for back pain. Negative for arthralgias, gait problem and myalgias.   Neurological: Negative for dizziness, tremors, weakness and numbness.   Psychiatric/Behavioral: Negative for behavioral problems and dysphoric mood. The patient is not nervous/anxious.        Objective     Vitals:    03/17/17 1352   BP: 142/96   Pulse: 82   Temp: 98.1 °F (36.7 °C)   SpO2: 98%       Physical Exam   Constitutional: She is oriented to person, place, and time. She appears well-developed and well-nourished.   Obesity noted     Neck: No thyromegaly present.   Cardiovascular: Normal rate and regular rhythm.    Pulmonary/Chest: Effort normal and breath sounds normal.   Abdominal: Soft. Bowel sounds are normal. There is no  tenderness.   Musculoskeletal:        Lumbar back: She exhibits decreased range of motion, tenderness, bony tenderness and pain. She exhibits no swelling, no deformity and no spasm.   Neurological: She is alert and oriented to person, place, and time. She has normal reflexes.   Psychiatric: She has a normal mood and affect. Her behavior is normal. Judgment and thought content normal.   Nursing note and vitals reviewed.      Assessment/Plan     Diagnoses and all orders for this visit:    Lumbar back pain with radiculopathy affecting left lower extremity    Chronic bilateral low back pain with left-sided sciatica    Obesity (BMI 30-39.9)    Other orders  -     topiramate (TOPAMAX) 25 MG tablet; Take 1 tablet by mouth 2 (Two) Times a Day.   encouraged low calorie diet, low carbohydrate, high protein, portion control.  Follow-up in one month for recheck of weight.  Encouraged patient to follow-up with her pain management specialist for a second spinal injection and she had good success with the first one.  She has a relative that is a  and I have encouraged her to sign up for some yoga instruction with him as well.

## 2017-03-22 ENCOUNTER — OUTSIDE FACILITY SERVICE (OUTPATIENT)
Dept: PAIN MEDICINE | Facility: CLINIC | Age: 47
End: 2017-03-22

## 2017-03-22 DIAGNOSIS — E66.9 MILD OBESITY: Primary | ICD-10-CM

## 2017-03-22 PROCEDURE — 27096 INJECT SACROILIAC JOINT: CPT | Performed by: ANESTHESIOLOGY

## 2017-03-22 PROCEDURE — 99152 MOD SED SAME PHYS/QHP 5/>YRS: CPT | Performed by: ANESTHESIOLOGY

## 2017-03-27 ENCOUNTER — TELEPHONE (OUTPATIENT)
Dept: FAMILY MEDICINE CLINIC | Facility: CLINIC | Age: 47
End: 2017-03-27

## 2017-03-27 NOTE — TELEPHONE ENCOUNTER
Patient states that the Topamax prescribe on 3/17 is not helping her. States that she still feels hungry.

## 2017-03-27 NOTE — TELEPHONE ENCOUNTER
Have patient increase dosage to 2 tablets in the morning and 1 in the afternoon of Topamax for dosage 50 mg the morning and 20 5 in the afternoon.  Have her keep her follow-up for 1 month from her previous appointment.

## 2017-04-10 ENCOUNTER — TELEPHONE (OUTPATIENT)
Dept: FAMILY MEDICINE CLINIC | Facility: CLINIC | Age: 47
End: 2017-04-10

## 2017-04-10 ENCOUNTER — OFFICE VISIT (OUTPATIENT)
Dept: PAIN MEDICINE | Facility: CLINIC | Age: 47
End: 2017-04-10

## 2017-04-10 VITALS
HEART RATE: 88 BPM | WEIGHT: 194 LBS | SYSTOLIC BLOOD PRESSURE: 127 MMHG | TEMPERATURE: 97.2 F | BODY MASS INDEX: 31.18 KG/M2 | DIASTOLIC BLOOD PRESSURE: 81 MMHG | OXYGEN SATURATION: 99 % | RESPIRATION RATE: 16 BRPM | HEIGHT: 66 IN

## 2017-04-10 DIAGNOSIS — F41.9 ANXIETY AND DEPRESSION: Primary | ICD-10-CM

## 2017-04-10 DIAGNOSIS — E66.9 MILD OBESITY: ICD-10-CM

## 2017-04-10 DIAGNOSIS — F32.A ANXIETY AND DEPRESSION: ICD-10-CM

## 2017-04-10 DIAGNOSIS — F41.9 ANXIETY AND DEPRESSION: ICD-10-CM

## 2017-04-10 DIAGNOSIS — M51.26 LUMBAR DISCOGENIC PAIN SYNDROME: ICD-10-CM

## 2017-04-10 DIAGNOSIS — M51.27 DISPLACEMENT OF INTERVERTEBRAL DISC OF LUMBOSACRAL REGION: ICD-10-CM

## 2017-04-10 DIAGNOSIS — R53.81 PHYSICAL DECONDITIONING: ICD-10-CM

## 2017-04-10 DIAGNOSIS — F32.A ANXIETY AND DEPRESSION: Primary | ICD-10-CM

## 2017-04-10 PROBLEM — M70.62 GREATER TROCHANTERIC BURSITIS OF LEFT HIP: Status: RESOLVED | Noted: 2017-01-31 | Resolved: 2017-04-10

## 2017-04-10 PROBLEM — G57.02 PIRIFORMIS SYNDROME OF LEFT SIDE: Status: RESOLVED | Noted: 2017-01-31 | Resolved: 2017-04-10

## 2017-04-10 PROBLEM — M53.3 SACROILIAC JOINT DYSFUNCTION OF LEFT SIDE: Status: RESOLVED | Noted: 2017-01-31 | Resolved: 2017-04-10

## 2017-04-10 PROCEDURE — 99215 OFFICE O/P EST HI 40 MIN: CPT | Performed by: ANESTHESIOLOGY

## 2017-04-10 NOTE — TELEPHONE ENCOUNTER
Please check with patient and find out which medication this is.  I believe it is probably Topamax.  If she needs refills of the Topamax this is fine but confirm which medication first.  If so you can refill times one month.

## 2017-04-10 NOTE — TELEPHONE ENCOUNTER
----- Message from Muriel Sandoval sent at 4/10/2017  2:23 PM EDT -----  Contact: PT.  PT. SAW MARIAH APPROX. 1 MONTH AGO.  NEW MEDICATION WAS INCREASED IN THE MIDDLE OF THE MTH.  PT. WAS TO CALL WHEN SHE RAN OUT TO GET A REFILL ON:  WEIGHT LOSS (PT. DID NOT KNOW NAME OF MED.), 25 MG., TAKEN 2 IN AM & 1 IN PM.  RX=[facility]/PATI ERIC LOCATION.  PT. CAN BE REACHED @: ABOVE #.

## 2017-04-10 NOTE — PROGRESS NOTES
"CHIEF COMPLAINT: \"Pain in my lower back and left hip.\"    BRIEF HISTORY: Mrs. Violet Castañeda is a 46 y.o. female, who returns to the clinic for recurrent left lower back and left hip pain. Patient underwent  Diagnostic and therapeutic left sacroiliac joint injection and left piriformis muscle injection on 03/22/2017, and experienced 100% immediately after her procedure. Pain recurred the next day after performing regular activities. Patient denies side effects from the procedure.  Current pain level: 6/10  Pain level ranges from 2/10 to 8/10   Patient complains of lower back, gluteal and hip.   Patient complains of constant pain with intermittent exacerbation, described as aching sensation.   Radiation of pain: radiates into the posterior aspect of the left hip.  Pain increases with: Pain increases with twisting, bending, sitting longer than 30 minutes and standing longer than 5 minutes.   Pain decreases with lying and walking.   Patient denies  pain, numbness and weakness in the lower extremities, except for occasional pain radiating down the lateral aspect of the left thigh up to the knee level. Patient denies  any new bladder or bowel problems.     Review of previous therapies and additional medical records:  Violet Castañeda has already failed the following measures, including:   Conservative measures: oral analgesics, opioids, topical analgesics, massage, physical therapy, ice and heat   Interventional measures: None  Surgical measures: No previous spine surgery  Violet Castañeda presents with significant comorbidities including axiety and depression, engaged in treatment.  In terms of current analgesics, Violet Castañeda takes: Diclofenac, gabapentin, tizanidine. Patient denies side effects from her medications.  I have reviewed her Tommy Report #[id number] consistent with medication reconciliation.    Review of Diagnostic Studies:  MRI LUMBAR SPINE WITHOUT CONTRAST-01/13/2017: I " "have reviewed the images of her MRI with the patient. The lumbar vertebral bodies are normally aligned. There is normal marrow signal.  The conus medullaris is slightly low lying at the level of L1 but there is no tethered cord. From L1-L2 through L4-L5 normal disc morphology and normal signal. L5-S1: Modic-type I reactive endplate degenerative change, disc desiccation, midline and leftward disc protrusion. There is no spinal stenosis.     Review of Systems   Musculoskeletal: Positive for arthralgias and back pain.   All other systems reviewed and are negative.     The following portions of the patient's history were reviewed and updated as appropriate: problem list, past medical history, past surgery history, social history, family history, medications, and allergies     /81  Pulse 88  Temp 97.2 °F (36.2 °C) (Oral)   Resp 16  Ht 66\" (167.6 cm)  Wt 194 lb (88 kg)  LMP  (Approximate)  SpO2 99%  BMI 31.31 kg/m2      Physical Exam   Neurologic Exam  Constitutional: Patient is oriented to person, place, and time. Vital signs are normal. Patient appears well-developed and well-nourished.   HENT: Head: Normocephalic and atraumatic. Eyes: Conjunctivae and lids are normal. Pupils: Equal, round, reactive to light.   Neck: Trachea normal. Neck supple. No JVD present.   Pulmonary Respiratory effort: No increased work of breathing or signs of respiratory distress. Auscultation of lungs: Clear to auscultation.   Cardiovascular Auscultation of heart: Normal rate and rhythm, normal S1 and S2, no murmurs.   Peripheral vascular exam: Normal.No edema.   Abdomen: The abdomen was soft and nontender. Bowel sounds were normal.   Musculoskeletal   Gait and station: Gait evaluation demonstrated a normal gait.   Lumbar spine: The range of motion of the lumbar spine is full and without pain. Extension, flexion, lateral flexion, rotation of the lumbar spine did not increase or reproduce pain. Lumbar facet joint loading maneuvers " are negative.   Abiodun and Gaenslen's tests are negative   Piriformis maneuvers are negative   Palpation of the bilateral ischial tuberosities, reveals no tenderness   Palpation of the bilateral greater trochanter, reveals no tenderness   Examination of the Iliotibial band: reveals no tenderness   Hip joints: The range of motion of the hip joints is full and without pain   Neurological: Patient is alert and oriented to person, place, and time. Speech: speech is normal. Cortical function: Normal mental status.   Cranial nerves: Cranial nerves 2-12 intact.   Reflex Scores:  Right patellar: 1+  Left patellar: 1+  Right achilles: 1+  Left achilles: 1+  Motor strength: 5/5  Motor Tone: normal tone.   Involuntary movements: none.   Superficial/Primitive Reflexes: primitive reflexes were absent.   Right Connors: absent  Left Connors: absent  Right ankle clonus: absent  Left ankle clonus: absent   No nuchal rigidity. Negative Kernig and Brudzinski. Spurling sign is negative. Lhermitte sign is negative. Negative long tract signs. Straight leg raising test is negative. Femoral stretch sign is negative.   Sensation: No sensory loss. Sensory exam: intact to light touch, intact pain and temperature sensation, intact vibration sensation and normal proprioception.   Coordination: Normal finger to nose and heel to shin. Normal balance and negative. Romberg's sign negative.   Skin and subcutaneous tissue: Skin is warm and intact. No rash noted. No cyanosis.   Psychiatric:   Judgment and insight: Normal.   Orientation to person, place and time: Normal.   Recent and remote memory: Intact.   Mood and affect: Normal.      ASSESSMENT:   1. Displacement of intervertebral disc of lumbosacral region    2. Lumbar discogenic pain syndrome    3. Mild obesity    4. Anxiety and depression    5. Physical deconditioning      PLAN: Patient continues dealing with her chronic pain condition and residual symptoms. Her SI joint dysfunction and  piriformis muscle syndrome have resolved. I have reviewed all available patient's medical records as well as previous therapies as referenced above under history of present illness. I had a lengthy conversation with Ms. Violet Castañeda regarding her chronic pain condition and potential therapeutic options including risks, benefits, alternative therapies, to name a few. Patient has failed to obtain pain relief with conservative measures, as referenced above. Therefore, I have proposed the following plan:  1. Long-term rehabilitation efforts:  A. Continue comprehensive physical therapy program for water therapy, core strengthening, gait and balance training, neurodynamics, ultrasound, myofascial release, cupping and dry needling   B. Start Pilates and water therapy as long term exercise programs  C. Referral to Dr. Katt Louis for cognitive behavioral therapy, biofeedback, assistance in the development of effective coping skills and sleep hygiene.  D. Referral to [facility]  2. Patient has been screened for tobacco use: Current tobacco non-user  3. Pharmacological measures:  A. Continue gabapentin 600 mg 3 times a day, as currently prescribed  B. Continue Motrin 600 mg 3 times a day, as currently prescribed  C. Continue tizanidine as currently prescribed  D. Trial with Flector patches (samples)  4. Interventional pain management measures: Patient will be scheduled for diagnostic and therapeutic left L5-S1 transforaminal epidural steroid injection. If patient continues struggling with pain, then, I will obtain provocative lumbar discography to clarify the origin of her pain versus neurosurgical consultation.  5. The patient has been instructed to contact my office with any questions or difficulties. The patient understands the plan and agrees to proceed accordingly.     I spent 45 minutes face-to-face with the patient, of which 25 minutes were spent counseling regarding  evaluation, diagnosis, prognosis, diagnostic testing, potential referrals, treatment options for chronic pain condition and overall rehabilitation, long-term management of concurrent comorbidities affecting effective pain control, risk and benefits of different interventions, alternative therapies and a comprehensive plan of care to address physical deconditioning     Patient Care Team:  Kianna Gandhi PA-C as PCP - General (Family Medicine)  Wallace Duran MD as Consulting Physician (Pain Medicine)  Mamie Schumacher RD as Dietitian (Nutrition)     No orders of the defined types were placed in this encounter.        Future Appointments  Date Time Provider Department Center   4/14/2017 8:00 AM Kianna Gandhi PA-C [facility] None   4/21/2017 10:00 AM Mamie Schumacher RD [facility]         Wallace Duran MD       EMR Dragon/Transcription disclaimer:  Much of this encounter note is an electronic transcription of spoken language to printed text. Electronic transcription of spoken language may permit erroneous, or at times, nonsensical words or phrases to be inadvertently transcribed. Although I have reviewed the note for such errors, some may still exist.

## 2017-04-11 RX ORDER — TOPIRAMATE 25 MG/1
25 TABLET ORAL 2 TIMES DAILY
Qty: 90 TABLET | Refills: 1 | Status: SHIPPED | OUTPATIENT
Start: 2017-04-11 | End: 2017-05-12 | Stop reason: SDUPTHER

## 2017-04-12 NOTE — TELEPHONE ENCOUNTER
Zhanna, I figured out that it is the Topamax 25 mg she is to take 2 in the morning and 1 in the afternoon dispensed #90 with 2 refills.  I tried to actually submit this electronically myself but for some reason we'll allow me to.  If you could please call this into the pharmacy.

## 2017-04-14 ENCOUNTER — OFFICE VISIT (OUTPATIENT)
Dept: FAMILY MEDICINE CLINIC | Facility: CLINIC | Age: 47
End: 2017-04-14

## 2017-04-14 VITALS
HEART RATE: 72 BPM | SYSTOLIC BLOOD PRESSURE: 124 MMHG | BODY MASS INDEX: 31.34 KG/M2 | OXYGEN SATURATION: 98 % | DIASTOLIC BLOOD PRESSURE: 82 MMHG | WEIGHT: 195 LBS | HEIGHT: 66 IN | TEMPERATURE: 97.5 F

## 2017-04-14 DIAGNOSIS — B00.2 ORAL HERPES SIMPLEX INFECTION: ICD-10-CM

## 2017-04-14 DIAGNOSIS — E66.9 OBESITY (BMI 30-39.9): Primary | ICD-10-CM

## 2017-04-14 DIAGNOSIS — M54.10 RADICULOPATHY OF LEG: ICD-10-CM

## 2017-04-14 DIAGNOSIS — M54.16 LUMBAR BACK PAIN WITH RADICULOPATHY AFFECTING LEFT LOWER EXTREMITY: ICD-10-CM

## 2017-04-14 PROCEDURE — 99214 OFFICE O/P EST MOD 30 MIN: CPT | Performed by: PHYSICIAN ASSISTANT

## 2017-04-14 PROCEDURE — 96372 THER/PROPH/DIAG INJ SC/IM: CPT | Performed by: PHYSICIAN ASSISTANT

## 2017-04-14 RX ORDER — ACYCLOVIR 50 MG/G
OINTMENT TOPICAL
Qty: 15 G | Refills: 1 | Status: SHIPPED | OUTPATIENT
Start: 2017-04-14 | End: 2017-08-15

## 2017-04-14 RX ORDER — CYANOCOBALAMIN 1000 UG/ML
1000 INJECTION, SOLUTION INTRAMUSCULAR; SUBCUTANEOUS
Status: SHIPPED | OUTPATIENT
Start: 2017-04-14

## 2017-04-14 RX ADMIN — CYANOCOBALAMIN 1000 MCG: 1000 INJECTION, SOLUTION INTRAMUSCULAR; SUBCUTANEOUS at 09:04

## 2017-04-14 NOTE — PROGRESS NOTES
Subjective   Violet Castañeda is a 46 y.o. female    History of Present Illness    Patient presents today for one-month follow-up on weight loss as well as chronic back pain with lower extremity radiculopathy.  She is seeing Dr. Obregon for this and has received her second spinal injection.  She is scheduled for her third spinal injection on the 26th.  She states so far it has not helped.  She has met with a  and is trying to improve her core muscle strength.  She states the one goal that she wants to continue to work towards in assisting her with reduced back pain is weight loss.  She has not seen any significant weight loss thus far on Topamax but believes that his a struggle because of the recurrent steroid injection she is receiving.  She continues to have numbness and burning in her legs.  She states she feels fatigued as well.  When she tries to exercise it aggravates her back pain.  She has never taken phentermine.  Her anxiety is currently stable.  She is trying to follow a low calorie low carb and high protein diet.  She is highly motivated to lose weight.  Patient request a prescription for treatment of fever blisters on her lip that continue to recur lately  The following portions of the patient's history were reviewed and updated as appropriate: allergies, current medications, past social history and problem list    Review of Systems   Constitutional: Negative for activity change, appetite change and unexpected weight change.   Cardiovascular: Negative for chest pain.   Gastrointestinal: Negative for abdominal distention, abdominal pain, diarrhea and nausea.   Skin: Positive for color change, rash and wound.        Patient has recurrent fever blisters on her lip     Neurological: Positive for numbness ( Chronic numbness and burning sensation in lower extremities with radiculopathy from degenerative disc disease).   Psychiatric/Behavioral: Negative for dysphoric mood. The patient  is not nervous/anxious.        Objective     Vitals:    04/14/17 0831   BP: 124/82   Pulse: 72   Temp: 97.5 °F (36.4 °C)   SpO2: 98%       Physical Exam   Constitutional: She appears well-developed and well-nourished.   Obesity noted     Neck: No thyromegaly present.   Cardiovascular: Normal rate and regular rhythm.    Pulmonary/Chest: Effort normal and breath sounds normal.   Abdominal: Soft. There is no tenderness.   Musculoskeletal: She exhibits no edema or tenderness.   Skin: Rash noted. There is erythema.   Crusted vesicle on lower lip consistent with herpes simplex.   Psychiatric: She has a normal mood and affect. Her behavior is normal. Judgment and thought content normal.   Nursing note and vitals reviewed.      Assessment/Plan     Diagnoses and all orders for this visit:    Obesity (BMI 30-39.9)    Radiculopathy of leg  -     cyanocobalamin injection 1,000 mcg; Inject 1 mL into the shoulder, thigh, or buttocks Every 28 (Twenty-Eight) Days.    Oral herpes simplex infection    Lumbar back pain with radiculopathy affecting left lower extremity    Other orders  -     acyclovir (ZOVIRAX) 5 % ointment; Apply  topically Every 3 (Three) Hours.     prescription given for phentermine 37.5 mg tablets one daily for assistance in weight loss #30 with no refills.  Discussed abuse potential of this medication.Tommy appropriate.  Discussed importance of continuing to follow a low-calorie, low-carb and high protein diet.  Recommended patient continue with Pilates and continue exercise when tolerable.  Continue on Topamax at this time.  B12 injection given.  Follow-up in one month for recheck of obesity.

## 2017-04-21 ENCOUNTER — APPOINTMENT (OUTPATIENT)
Dept: NUTRITION | Facility: HOSPITAL | Age: 47
End: 2017-04-21

## 2017-04-26 ENCOUNTER — OUTSIDE FACILITY SERVICE (OUTPATIENT)
Dept: PAIN MEDICINE | Facility: CLINIC | Age: 47
End: 2017-04-26

## 2017-04-26 PROCEDURE — 64483 NJX AA&/STRD TFRM EPI L/S 1: CPT | Performed by: ANESTHESIOLOGY

## 2017-04-26 PROCEDURE — 99152 MOD SED SAME PHYS/QHP 5/>YRS: CPT | Performed by: ANESTHESIOLOGY

## 2017-05-12 ENCOUNTER — OFFICE VISIT (OUTPATIENT)
Dept: FAMILY MEDICINE CLINIC | Facility: CLINIC | Age: 47
End: 2017-05-12

## 2017-05-12 VITALS
BODY MASS INDEX: 29.89 KG/M2 | HEIGHT: 66 IN | OXYGEN SATURATION: 98 % | SYSTOLIC BLOOD PRESSURE: 132 MMHG | TEMPERATURE: 97.9 F | DIASTOLIC BLOOD PRESSURE: 88 MMHG | WEIGHT: 186 LBS | HEART RATE: 85 BPM

## 2017-05-12 DIAGNOSIS — E66.9 OBESITY (BMI 30-39.9): Primary | ICD-10-CM

## 2017-05-12 DIAGNOSIS — J30.1 SEASONAL ALLERGIC RHINITIS DUE TO POLLEN: ICD-10-CM

## 2017-05-12 PROCEDURE — 99214 OFFICE O/P EST MOD 30 MIN: CPT | Performed by: PHYSICIAN ASSISTANT

## 2017-05-12 RX ORDER — TOPIRAMATE 25 MG/1
TABLET ORAL
Qty: 120 TABLET | Refills: 2 | Status: SHIPPED | OUTPATIENT
Start: 2017-05-12 | End: 2017-06-12 | Stop reason: SDUPTHER

## 2017-05-25 ENCOUNTER — TELEPHONE (OUTPATIENT)
Dept: FAMILY MEDICINE CLINIC | Facility: CLINIC | Age: 47
End: 2017-05-25

## 2017-06-12 ENCOUNTER — OFFICE VISIT (OUTPATIENT)
Dept: FAMILY MEDICINE CLINIC | Facility: CLINIC | Age: 47
End: 2017-06-12

## 2017-06-12 VITALS
WEIGHT: 177 LBS | HEIGHT: 66 IN | HEART RATE: 60 BPM | DIASTOLIC BLOOD PRESSURE: 80 MMHG | SYSTOLIC BLOOD PRESSURE: 122 MMHG | BODY MASS INDEX: 28.45 KG/M2 | OXYGEN SATURATION: 98 % | TEMPERATURE: 97.7 F

## 2017-06-12 DIAGNOSIS — E66.3 OVERWEIGHT (BMI 25.0-29.9): ICD-10-CM

## 2017-06-12 DIAGNOSIS — Z13.220 SCREENING CHOLESTEROL LEVEL: Primary | ICD-10-CM

## 2017-06-12 PROCEDURE — 99213 OFFICE O/P EST LOW 20 MIN: CPT | Performed by: PHYSICIAN ASSISTANT

## 2017-06-12 RX ORDER — TOPIRAMATE 25 MG/1
TABLET ORAL
Qty: 120 TABLET | Refills: 5 | Status: SHIPPED | OUTPATIENT
Start: 2017-06-12 | End: 2018-01-05 | Stop reason: SDUPTHER

## 2017-06-12 NOTE — PROGRESS NOTES
Subjective   Violte Castañeda is a 46 y.o. female    History of Present Illness  Patient presents today for one-month follow-up on weight loss management secondary to obesity in association with chronic back pain with degenerative disc disease.  Her weight continues to come down successfully, her weight has been reduced from 186-177 this month.  She continues to feel that the accommodation of Topamax and phentermine is helping her to suppress her appetite, reduce her overall caloric intake and assist in weight loss.  She's continuing to do well with her chronic back pain, altering her lifestyle to avoid strenuous activities while continuing to increase her physical exercise to assist with weight loss and strengthening of her core muscles.  The following portions of the patient's history were reviewed and updated as appropriate: allergies, current medications, past social history and problem list    Review of Systems   Constitutional: Positive for activity change and appetite change. Negative for unexpected weight change.   Cardiovascular: Negative for chest pain.   Gastrointestinal: Negative for abdominal distention, abdominal pain, diarrhea and nausea.   Psychiatric/Behavioral: Negative for dysphoric mood. The patient is not nervous/anxious.        Objective     Vitals:    06/12/17 0806   BP: 122/80   Pulse: 60   Temp: 97.7 °F (36.5 °C)   SpO2: 98%       Physical Exam   Constitutional: She appears well-developed and well-nourished.   Obesity noted     Neck: No thyromegaly present.   Cardiovascular: Normal rate and regular rhythm.    Pulmonary/Chest: Effort normal and breath sounds normal.   Abdominal: Soft. There is no tenderness.   Psychiatric: She has a normal mood and affect. Her behavior is normal. Judgment and thought content normal.   Nursing note and vitals reviewed.      Assessment/Plan     Diagnoses and all orders for this visit:    Screening cholesterol level  -     Lipid Panel    Overweight (BMI  25.0-29.9)  -     topiramate (TOPAMAX) 25 MG tablet; Take 2 twice daily  Indications: Take two tablets po every moring and one tablet at night  -     Lipid Panel  Refilled phentermine 37.5 mg 1 daily #30 with no refills.  Discussed abuse potential this medication, Tommy appropriate.  Follow-up in one month for recheck.

## 2017-06-21 ENCOUNTER — TELEPHONE (OUTPATIENT)
Dept: FAMILY MEDICINE CLINIC | Facility: CLINIC | Age: 47
End: 2017-06-21

## 2017-07-12 ENCOUNTER — OFFICE VISIT (OUTPATIENT)
Dept: FAMILY MEDICINE CLINIC | Facility: CLINIC | Age: 47
End: 2017-07-12

## 2017-07-12 ENCOUNTER — APPOINTMENT (OUTPATIENT)
Dept: LAB | Facility: HOSPITAL | Age: 47
End: 2017-07-12

## 2017-07-12 VITALS
BODY MASS INDEX: 28.12 KG/M2 | TEMPERATURE: 98.3 F | OXYGEN SATURATION: 99 % | HEART RATE: 88 BPM | HEIGHT: 66 IN | SYSTOLIC BLOOD PRESSURE: 120 MMHG | DIASTOLIC BLOOD PRESSURE: 84 MMHG | WEIGHT: 175 LBS

## 2017-07-12 DIAGNOSIS — J30.1 NON-SEASONAL ALLERGIC RHINITIS DUE TO POLLEN: Primary | ICD-10-CM

## 2017-07-12 DIAGNOSIS — E66.3 OVERWEIGHT (BMI 25.0-29.9): ICD-10-CM

## 2017-07-12 LAB
ARTICHOKE IGE QN: 106 MG/DL (ref 0–130)
CHOLEST SERPL-MCNC: 212 MG/DL (ref 0–200)
HDLC SERPL-MCNC: 70 MG/DL (ref 40–60)
TRIGL SERPL-MCNC: 144 MG/DL (ref 0–150)

## 2017-07-12 PROCEDURE — 36415 COLL VENOUS BLD VENIPUNCTURE: CPT | Performed by: PHYSICIAN ASSISTANT

## 2017-07-12 PROCEDURE — 80061 LIPID PANEL: CPT | Performed by: PHYSICIAN ASSISTANT

## 2017-07-12 PROCEDURE — 99213 OFFICE O/P EST LOW 20 MIN: CPT | Performed by: PHYSICIAN ASSISTANT

## 2017-07-12 NOTE — PROGRESS NOTES
Subjective   Violet Castañeda is a 47 y.o. female    History of Present Illness  Pt here for fu on weight loss management, weight down 2 pounds.  She continues to feel phentermine is helping her significantly with appetite control.  She continues to struggle with snacking in the evenings.  She is adjusting her snacks to where she is finding things that are higher in protein.  She is having some problems with ongoing postnasal drainage at night time, she takes Allegra in the morning and Benadryl at nighttime.  The following portions of the patient's history were reviewed and updated as appropriate: allergies, current medications, past social history and problem list    Review of Systems   Constitutional: Positive for activity change and appetite change. Negative for unexpected weight change.   HENT: Positive for postnasal drip ( having continued pnd at night on benadryl).    Cardiovascular: Negative for chest pain.   Gastrointestinal: Negative for abdominal distention, abdominal pain, diarrhea and nausea.   Psychiatric/Behavioral: Negative for dysphoric mood. The patient is not nervous/anxious.        Objective     Vitals:    07/12/17 0804   BP: 120/84   Pulse: 88   Temp: 98.3 °F (36.8 °C)   SpO2: 99%       Physical Exam   Constitutional: She appears well-developed and well-nourished.   Obesity noted     HENT:   Head: Normocephalic and atraumatic.   Neck: No thyromegaly present.   Cardiovascular: Normal rate and regular rhythm.    Pulmonary/Chest: Effort normal and breath sounds normal.   Abdominal: Soft. There is no tenderness.   Psychiatric: She has a normal mood and affect. Her behavior is normal. Judgment and thought content normal.   Nursing note and vitals reviewed.      Assessment/Plan     Diagnoses and all orders for this visit:    Non-seasonal allergic rhinitis due to pollen  -     Chlorcyclizine HCl (AHIST) 25 MG tablet; Take 25 mg by mouth Every Night.    Overweight (BMI 25.0-29.9)    Continue with  Allegra, discontinue Benadryl, use a history nighttime for postnasal drainage.  Refilled phentermine 37.5 mg 1 daily #30 with no refills, continue to encourage patient regarding low calorie diet and adding on high protein snacks as needed in the evenings.

## 2017-07-21 ENCOUNTER — TELEPHONE (OUTPATIENT)
Dept: FAMILY MEDICINE CLINIC | Facility: CLINIC | Age: 47
End: 2017-07-21

## 2017-07-21 RX ORDER — LEVOCETIRIZINE DIHYDROCHLORIDE 5 MG/1
5 TABLET, FILM COATED ORAL EVERY EVENING
Qty: 30 TABLET | Refills: 2 | Status: SHIPPED | OUTPATIENT
Start: 2017-07-21 | End: 2017-08-15

## 2017-07-26 ENCOUNTER — TELEPHONE (OUTPATIENT)
Dept: FAMILY MEDICINE CLINIC | Facility: CLINIC | Age: 47
End: 2017-07-26

## 2017-07-26 DIAGNOSIS — J30.2 SEASONAL ALLERGIC RHINITIS, UNSPECIFIED ALLERGIC RHINITIS TRIGGER: Primary | ICD-10-CM

## 2017-08-15 ENCOUNTER — OFFICE VISIT (OUTPATIENT)
Dept: FAMILY MEDICINE CLINIC | Facility: CLINIC | Age: 47
End: 2017-08-15

## 2017-08-15 VITALS
BODY MASS INDEX: 28.54 KG/M2 | SYSTOLIC BLOOD PRESSURE: 122 MMHG | HEIGHT: 66 IN | HEART RATE: 81 BPM | TEMPERATURE: 97.9 F | WEIGHT: 177.6 LBS | DIASTOLIC BLOOD PRESSURE: 80 MMHG | RESPIRATION RATE: 14 BRPM | OXYGEN SATURATION: 100 %

## 2017-08-15 DIAGNOSIS — E66.3 OVERWEIGHT (BMI 25.0-29.9): ICD-10-CM

## 2017-08-15 DIAGNOSIS — M54.10 RADICULOPATHY OF LEG: Primary | ICD-10-CM

## 2017-08-15 DIAGNOSIS — F41.9 ANXIETY: ICD-10-CM

## 2017-08-15 DIAGNOSIS — G47.00 INSOMNIA, UNSPECIFIED TYPE: ICD-10-CM

## 2017-08-15 PROCEDURE — 99214 OFFICE O/P EST MOD 30 MIN: CPT | Performed by: PHYSICIAN ASSISTANT

## 2017-08-15 NOTE — PROGRESS NOTES
Subjective   Violet Castañeda is a 47 y.o. female    History of Present Illness  Patient presents today for follow-up on radiculopathy of leg in association with degenerative disc disease for refills of gabapentin.  This condition is stable.  Additionally she is here for follow-up on generalized anxiety disorder with associated insomnia, this is stable on Xanax.  She is also here for follow-up on weight loss management due to mild obesity.  Weight is stable she's doing well on Topamax and phentermine.  Denies any adverse effects from medications.  She continues to feel that phentermine is helping her to reduce her appetite and control her portions of food and calories.  She is continuing to struggle with snacking in the evenings.The following portions of the patient's history were reviewed and updated as appropriate: allergies, current medications, past social history and problem list    Review of Systems   Constitutional: Positive for appetite change. Negative for activity change, fatigue and unexpected weight change.   Respiratory: Negative for chest tightness and shortness of breath.    Cardiovascular: Negative for chest pain.   Gastrointestinal: Negative for abdominal distention, abdominal pain, diarrhea and nausea.   Neurological: Negative for dizziness, tremors, weakness, light-headedness, numbness and headaches.   Psychiatric/Behavioral: Negative for agitation, behavioral problems, confusion, decreased concentration, dysphoric mood, sleep disturbance and suicidal ideas. The patient is not nervous/anxious.        Objective     Vitals:    08/15/17 1152   BP: 122/80   Pulse: 81   Resp: 14   Temp: 97.9 °F (36.6 °C)   SpO2: 100%       Physical Exam   Constitutional: She is oriented to person, place, and time. She appears well-developed and well-nourished. No distress.   Mild Obesity noted     Eyes: Conjunctivae are normal.   Neck: No thyroid mass and no thyromegaly present.   Cardiovascular: Normal rate,  regular rhythm and normal heart sounds.    Pulmonary/Chest: Effort normal and breath sounds normal.   Abdominal: Soft. There is no tenderness.   Neurological: She is alert and oriented to person, place, and time. Coordination normal.   Skin: She is not diaphoretic.   Psychiatric: Her speech is normal and behavior is normal. Judgment and thought content normal. Her mood appears anxious. Her affect is not angry and not inappropriate. Cognition and memory are normal. She does not exhibit a depressed mood. She is attentive.   Nursing note and vitals reviewed.      Assessment/Plan     Diagnoses and all orders for this visit:    Radiculopathy of leg    Overweight (BMI 25.0-29.9)    Insomnia, unspecified type    Anxiety  Refill given on phentermine 37.5 mg 1 daily #30 with 0 refills discussed abuse potential this medication and encourage patient to continue staying active with regular exercise daily and following a low-calorie, low-carb and high protein diet.  Follow-up in one month for recheck.  Refilled gabapentin at current dosage of 600 mg 4 times a day and 360 with 3 refills, this will be faxed to her mail-order pharmacy.  Refilled Xanax 0.5 mg 1-2 nightly as needed for anxiety and insomnia dispensed #60 with 5 refills.  Follow-up in 6 months for recheck.

## 2017-09-27 ENCOUNTER — OFFICE VISIT (OUTPATIENT)
Dept: FAMILY MEDICINE CLINIC | Facility: CLINIC | Age: 47
End: 2017-09-27

## 2017-09-27 VITALS
WEIGHT: 177.8 LBS | TEMPERATURE: 97.9 F | RESPIRATION RATE: 14 BRPM | BODY MASS INDEX: 28.57 KG/M2 | HEIGHT: 66 IN | OXYGEN SATURATION: 99 % | DIASTOLIC BLOOD PRESSURE: 90 MMHG | HEART RATE: 90 BPM | SYSTOLIC BLOOD PRESSURE: 130 MMHG

## 2017-09-27 DIAGNOSIS — E66.3 OVERWEIGHT (BMI 25.0-29.9): ICD-10-CM

## 2017-09-27 DIAGNOSIS — J30.1 NON-SEASONAL ALLERGIC RHINITIS DUE TO POLLEN: Primary | ICD-10-CM

## 2017-09-27 PROCEDURE — 99213 OFFICE O/P EST LOW 20 MIN: CPT | Performed by: PHYSICIAN ASSISTANT

## 2017-09-27 RX ORDER — PHENTERMINE HYDROCHLORIDE 37.5 MG/1
37.5 CAPSULE ORAL EVERY MORNING
COMMUNITY
End: 2019-05-13

## 2017-09-27 RX ORDER — FEXOFENADINE HCL 180 MG/1
180 TABLET ORAL DAILY
Qty: 30 TABLET | Refills: 11 | Status: SHIPPED | OUTPATIENT
Start: 2017-09-27 | End: 2018-02-06 | Stop reason: SDUPTHER

## 2017-09-27 NOTE — PROGRESS NOTES
Subjective   Violet Barrera is a 47 y.o. female    History of Present Illness  Patient comes in today for follow-up on weight loss management in association with being overweight.  She has been successful phentermine helping her to control her snacking and keeping her calorie intake controlled.  She is following a low-carb, high-protein diet.  She is also here for follow-up on nonseasonal allergic rhinitis, stable on Allegra, needs refills.  The following portions of the patient's history were reviewed and updated as appropriate: allergies, current medications, past social history and problem list    Review of Systems   Constitutional: Positive for activity change and appetite change. Negative for chills, fatigue, fever and unexpected weight change.   HENT: Positive for congestion, postnasal drip, rhinorrhea, sneezing and sore throat. Negative for ear pain, hearing loss, sinus pressure and trouble swallowing.    Eyes: Negative for itching.   Respiratory: Negative for cough.    Cardiovascular: Negative for chest pain.   Gastrointestinal: Negative for abdominal distention, abdominal pain, diarrhea and nausea.   Neurological: Negative for headaches.   Psychiatric/Behavioral: Negative for dysphoric mood. The patient is not nervous/anxious.        Objective     Vitals:    09/27/17 0834   BP: 130/90   Pulse: 90   Resp: 14   Temp: 97.9 °F (36.6 °C)   SpO2: 99%       Physical Exam   Constitutional: She appears well-developed and well-nourished.   Obesity noted     Neck: No thyromegaly present.   Cardiovascular: Normal rate and regular rhythm.    Pulmonary/Chest: Effort normal and breath sounds normal.   Abdominal: Soft. There is no tenderness.   Psychiatric: She has a normal mood and affect. Her behavior is normal. Judgment and thought content normal.   Nursing note and vitals reviewed.      Assessment/Plan     Diagnoses and all orders for this visit:    Non-seasonal allergic rhinitis due to pollen    Overweight (BMI  25.0-29.9)    Other orders  -     phentermine 37.5 MG capsule; Take 37.5 mg by mouth Every Morning.  -     fexofenadine (ALLEGRA ALLERGY) 180 MG tablet; Take 1 tablet by mouth Daily.  Refilled phentermine 37.5 mg tablets one daily #30 with no refills encouraged continued low calorie, low carb and high protein diet and follow-up in one month for recheck of weight.

## 2017-10-20 ENCOUNTER — TRANSCRIBE ORDERS (OUTPATIENT)
Dept: ADMINISTRATIVE | Facility: HOSPITAL | Age: 47
End: 2017-10-20

## 2017-10-20 DIAGNOSIS — Z12.31 VISIT FOR SCREENING MAMMOGRAM: Primary | ICD-10-CM

## 2017-11-07 ENCOUNTER — OFFICE VISIT (OUTPATIENT)
Dept: FAMILY MEDICINE CLINIC | Facility: CLINIC | Age: 47
End: 2017-11-07

## 2017-11-07 ENCOUNTER — HOSPITAL ENCOUNTER (OUTPATIENT)
Dept: MAMMOGRAPHY | Facility: HOSPITAL | Age: 47
Discharge: HOME | End: 2017-11-07
Admitting: OBSTETRICS & GYNECOLOGY

## 2017-11-07 VITALS
TEMPERATURE: 98.1 F | SYSTOLIC BLOOD PRESSURE: 124 MMHG | OXYGEN SATURATION: 99 % | HEIGHT: 66 IN | RESPIRATION RATE: 14 BRPM | DIASTOLIC BLOOD PRESSURE: 80 MMHG | WEIGHT: 177.6 LBS | HEART RATE: 95 BPM | BODY MASS INDEX: 28.54 KG/M2

## 2017-11-07 DIAGNOSIS — E66.3 OVERWEIGHT (BMI 25.0-29.9): Primary | ICD-10-CM

## 2017-11-07 DIAGNOSIS — Z12.31 VISIT FOR SCREENING MAMMOGRAM: ICD-10-CM

## 2017-11-07 PROCEDURE — 77063 BREAST TOMOSYNTHESIS BI: CPT

## 2017-11-07 PROCEDURE — G0202 SCR MAMMO BI INCL CAD: HCPCS

## 2017-11-07 PROCEDURE — 99213 OFFICE O/P EST LOW 20 MIN: CPT | Performed by: PHYSICIAN ASSISTANT

## 2017-11-07 PROCEDURE — 77067 SCR MAMMO BI INCL CAD: CPT | Performed by: RADIOLOGY

## 2017-11-07 PROCEDURE — 77063 BREAST TOMOSYNTHESIS BI: CPT | Performed by: RADIOLOGY

## 2017-11-07 NOTE — PROGRESS NOTES
Subjective   Violet Barrera is a 47 y.o. female    History of Present Illness  Pt here today for fu on weight management in association with being overweight. Weight is stable. Phentermine is continuing to assist with weight control.  Patient states that by taking phentermine she significantly reduces her snacking and portion control.  She has recently started working for the [employer] as a  in [place] and states that she has been extremely busy, it has been a sharp learning curve but she is enjoying her job.  The following portions of the patient's history were reviewed and updated as appropriate: allergies, current medications, past social history and problem list    Review of Systems   Constitutional: Positive for activity change and appetite change. Negative for unexpected weight change.   Cardiovascular: Negative for chest pain.   Gastrointestinal: Negative for abdominal distention, abdominal pain, diarrhea and nausea.   Psychiatric/Behavioral: Negative for dysphoric mood. The patient is not nervous/anxious.        Objective     Vitals:    11/07/17 0805   BP: 124/80   Pulse: 95   Resp: 14   Temp: 98.1 °F (36.7 °C)   SpO2: 99%       Physical Exam   Constitutional: She appears well-developed and well-nourished.   Obesity noted     Neck: No thyromegaly present.   Cardiovascular: Normal rate and regular rhythm.    Pulmonary/Chest: Effort normal and breath sounds normal.   Abdominal: Soft. There is no tenderness.   Psychiatric: She has a normal mood and affect. Her behavior is normal. Judgment and thought content normal.   Nursing note and vitals reviewed.      Assessment/Plan     Diagnoses and all orders for this visit:    Overweight (BMI 25.0-29.9)    Counseled patient regarding importance of drinking adequate amounts of water, following a low-carb high-protein low calorie diet, refilled phentermine 37.5 mg tabs 1 daily #30 with no refills, Tommy appropriate and reviewed.  Follow-up in one  month for recheck.

## 2017-11-19 DIAGNOSIS — I10 ESSENTIAL HYPERTENSION: ICD-10-CM

## 2017-11-20 RX ORDER — HYDROCHLOROTHIAZIDE 25 MG/1
TABLET ORAL
Qty: 180 TABLET | Refills: 3 | Status: SHIPPED | OUTPATIENT
Start: 2017-11-20 | End: 2018-02-06 | Stop reason: SDUPTHER

## 2017-11-20 RX ORDER — LOSARTAN POTASSIUM 50 MG/1
TABLET ORAL
Qty: 90 TABLET | Refills: 3 | Status: SHIPPED | OUTPATIENT
Start: 2017-11-20 | End: 2018-02-06 | Stop reason: SDUPTHER

## 2017-11-20 RX ORDER — BUPROPION HYDROCHLORIDE 300 MG/1
TABLET ORAL
Qty: 90 TABLET | Refills: 3 | Status: SHIPPED | OUTPATIENT
Start: 2017-11-20 | End: 2018-02-06 | Stop reason: SDUPTHER

## 2018-01-05 ENCOUNTER — OFFICE VISIT (OUTPATIENT)
Dept: FAMILY MEDICINE CLINIC | Facility: CLINIC | Age: 48
End: 2018-01-05

## 2018-01-05 VITALS
OXYGEN SATURATION: 100 % | BODY MASS INDEX: 28.28 KG/M2 | TEMPERATURE: 98.2 F | HEIGHT: 66 IN | WEIGHT: 176 LBS | HEART RATE: 76 BPM | SYSTOLIC BLOOD PRESSURE: 132 MMHG | DIASTOLIC BLOOD PRESSURE: 84 MMHG

## 2018-01-05 DIAGNOSIS — E66.3 OVERWEIGHT (BMI 25.0-29.9): ICD-10-CM

## 2018-01-05 DIAGNOSIS — F41.9 ANXIETY: Primary | ICD-10-CM

## 2018-01-05 PROCEDURE — 99213 OFFICE O/P EST LOW 20 MIN: CPT | Performed by: PHYSICIAN ASSISTANT

## 2018-01-05 RX ORDER — TOPIRAMATE 25 MG/1
TABLET ORAL
Qty: 120 TABLET | Refills: 5 | Status: SHIPPED | OUTPATIENT
Start: 2018-01-05 | End: 2018-02-06 | Stop reason: SDUPTHER

## 2018-01-05 NOTE — PROGRESS NOTES
Subjective   Violet Barrera is a 47 y.o. female    History of Present Illness  Patient comes in today for follow-up on generalized anxiety disorder as well as weight loss management association with being overweight.  She is due for refills of her phentermine, Xanax and Topamax.  She is doing well in regards to weight loss, anxiety is well controlled, she continues to enjoy her job working for the [employer].  The following portions of the patient's history were reviewed and updated as appropriate: allergies, current medications, past social history and problem list    Review of Systems   Constitutional: Positive for activity change. Negative for appetite change, fatigue and unexpected weight change.   Respiratory: Negative for chest tightness and shortness of breath.    Cardiovascular: Negative for chest pain.   Gastrointestinal: Negative for abdominal distention, abdominal pain, diarrhea and nausea.   Neurological: Negative for dizziness, tremors, weakness, light-headedness and headaches.   Psychiatric/Behavioral: Negative for agitation, behavioral problems, confusion, decreased concentration, dysphoric mood, sleep disturbance and suicidal ideas. The patient is nervous/anxious.        Objective     Vitals:    01/05/18 0811   BP: 132/84   Pulse: 76   Temp: 98.2 °F (36.8 °C)   SpO2: 100%       Physical Exam   Constitutional: She is oriented to person, place, and time. She appears well-developed and well-nourished.   Obesity noted     Neck: No thyroid mass and no thyromegaly present.   Cardiovascular: Normal rate, regular rhythm and normal heart sounds.    Pulmonary/Chest: Effort normal and breath sounds normal.   Abdominal: Soft. There is no tenderness.   Neurological: She is alert and oriented to person, place, and time.   Psychiatric: She has a normal mood and affect. Her speech is normal and behavior is normal. Judgment and thought content normal. Her affect is not angry and not inappropriate. Cognition  and memory are normal. She does not exhibit a depressed mood. She is attentive.   Nursing note and vitals reviewed.      Assessment/Plan     Diagnoses and all orders for this visit:    Anxiety    Overweight (BMI 25.0-29.9)  -     topiramate (TOPAMAX) 25 MG tablet; Take 2 twice daily  Indications: Take two tablets po every moring and one tablet at night    Refilled phentermine 37.5 mg tablets one daily for weight loss #30 with 5 refills and refilled Topamax as noted above.  Refilled Xanax 0.5 mg 1 twice a day as needed for anxiety #60 with 5 refills.  Discussed abuse potential these medications, Tommy is appropriate, reviewed.  Follow-up in 3 months and as needed.

## 2018-01-19 ENCOUNTER — TELEPHONE (OUTPATIENT)
Dept: FAMILY MEDICINE CLINIC | Facility: CLINIC | Age: 48
End: 2018-01-19

## 2018-01-19 NOTE — TELEPHONE ENCOUNTER
Just spoke with patient, she is requesting a refill of her daughters Zoloft 50 mg 1 daily, I've seen her daughter Sarah Beth Dickerson in the past at [facility] and she is transferring her care here but has not been able to get in yet.  She will schedule appointment to come in to establish care here and we will call in a three-month supply for her Zoloft.

## 2018-01-19 NOTE — TELEPHONE ENCOUNTER
Called patient about the concerns on Zoloft, patient refused to speak to me about these issues. She only wants to talk to you. Advised patient that you are currently seeing patients and may not be able to call her this morning. Patient verbalized understanding and states she will wait for your call

## 2018-02-06 ENCOUNTER — TELEPHONE (OUTPATIENT)
Dept: FAMILY MEDICINE CLINIC | Facility: CLINIC | Age: 48
End: 2018-02-06

## 2018-02-06 DIAGNOSIS — I10 ESSENTIAL HYPERTENSION: ICD-10-CM

## 2018-02-06 DIAGNOSIS — E66.3 OVERWEIGHT (BMI 25.0-29.9): ICD-10-CM

## 2018-02-06 RX ORDER — BUPROPION HYDROCHLORIDE 300 MG/1
300 TABLET ORAL EVERY MORNING
Qty: 90 TABLET | Refills: 3 | Status: SHIPPED | OUTPATIENT
Start: 2018-02-06 | End: 2019-03-25 | Stop reason: SDUPTHER

## 2018-02-06 RX ORDER — FEXOFENADINE HCL 180 MG/1
180 TABLET ORAL DAILY
Qty: 90 TABLET | Refills: 1 | Status: SHIPPED | OUTPATIENT
Start: 2018-02-06 | End: 2020-01-03 | Stop reason: SDUPTHER

## 2018-02-06 RX ORDER — LOSARTAN POTASSIUM 50 MG/1
50 TABLET ORAL DAILY
Qty: 90 TABLET | Refills: 1 | Status: SHIPPED | OUTPATIENT
Start: 2018-02-06 | End: 2018-03-06 | Stop reason: SDUPTHER

## 2018-02-06 RX ORDER — TOPIRAMATE 25 MG/1
TABLET ORAL
Qty: 270 TABLET | Refills: 1 | Status: SHIPPED | OUTPATIENT
Start: 2018-02-06 | End: 2018-03-06 | Stop reason: SDUPTHER

## 2018-02-06 RX ORDER — HYDROCHLOROTHIAZIDE 25 MG/1
TABLET ORAL
Qty: 180 TABLET | Refills: 1 | Status: SHIPPED | OUTPATIENT
Start: 2018-02-06 | End: 2018-03-06 | Stop reason: SDUPTHER

## 2018-02-06 NOTE — TELEPHONE ENCOUNTER
----- Message from Ingrid Davis sent at 2/6/2018 11:56 AM EST -----  Contact: PATIENT  PLEASE CALL HER SHE HAS SOME CHANGES SHE NEEDS TO MAKE WITH HER RX AND WHERE MEDICATIONS ARE TO GO. THANKS     [phone]

## 2018-02-06 NOTE — TELEPHONE ENCOUNTER
Patient is requesting 90 day supply to [facility] for : Buproprion 300mg 1 QAM, Allegra 180mg 1 po QD, HCTZ 25mg 2 po daily, Losartan 50mg 1 po daily and Topamax 25mg 2 po QAM and 1 po QHS

## 2018-02-06 NOTE — TELEPHONE ENCOUNTER
Patient is now using [facility]. She is needing 90 day supplies on her medication, which was approved by aliyah and sent to her mail order pharm

## 2018-03-06 DIAGNOSIS — I10 ESSENTIAL HYPERTENSION: ICD-10-CM

## 2018-03-06 DIAGNOSIS — E66.3 OVERWEIGHT (BMI 25.0-29.9): ICD-10-CM

## 2018-03-06 RX ORDER — HYDROCHLOROTHIAZIDE 25 MG/1
TABLET ORAL
Qty: 180 TABLET | Refills: 1 | Status: SHIPPED | OUTPATIENT
Start: 2018-03-06 | End: 2018-07-31 | Stop reason: SDUPTHER

## 2018-03-06 RX ORDER — TOPIRAMATE 25 MG/1
TABLET ORAL
Qty: 270 TABLET | Refills: 1 | Status: SHIPPED | OUTPATIENT
Start: 2018-03-06 | End: 2018-07-31 | Stop reason: SDUPTHER

## 2018-03-06 RX ORDER — LOSARTAN POTASSIUM 50 MG/1
50 TABLET ORAL DAILY
Qty: 90 TABLET | Refills: 1 | Status: SHIPPED | OUTPATIENT
Start: 2018-03-06 | End: 2018-07-31 | Stop reason: SDUPTHER

## 2018-05-02 ENCOUNTER — TELEPHONE (OUTPATIENT)
Dept: FAMILY MEDICINE CLINIC | Facility: CLINIC | Age: 48
End: 2018-05-02

## 2018-05-02 RX ORDER — LEVOCETIRIZINE DIHYDROCHLORIDE 5 MG/1
5 TABLET, FILM COATED ORAL EVERY EVENING
Qty: 30 TABLET | Refills: 1 | Status: SHIPPED | OUTPATIENT
Start: 2018-05-02 | End: 2018-07-31 | Stop reason: SDUPTHER

## 2018-05-02 NOTE — TELEPHONE ENCOUNTER
----- Message from Emely Woodson sent at 5/2/2018  2:23 PM EDT -----  Contact: ARCENIO SAUNDERS   AHIST IS ON BACKORDER, NEED AN ALTERNATE MED SENT IN    [facility] - [place], [place] - [address] - [phone]  - [phone] (Phone)  [phone] (Fax)

## 2018-07-31 DIAGNOSIS — I10 ESSENTIAL HYPERTENSION: ICD-10-CM

## 2018-07-31 DIAGNOSIS — E66.3 OVERWEIGHT (BMI 25.0-29.9): ICD-10-CM

## 2018-07-31 RX ORDER — LOSARTAN POTASSIUM 50 MG/1
50 TABLET ORAL DAILY
Qty: 90 TABLET | Refills: 0 | Status: SHIPPED | OUTPATIENT
Start: 2018-07-31 | End: 2018-08-10 | Stop reason: SDUPTHER

## 2018-07-31 RX ORDER — TOPIRAMATE 25 MG/1
TABLET ORAL
Qty: 270 TABLET | Refills: 0 | Status: SHIPPED | OUTPATIENT
Start: 2018-07-31 | End: 2018-09-23 | Stop reason: SDUPTHER

## 2018-07-31 RX ORDER — HYDROCHLOROTHIAZIDE 25 MG/1
TABLET ORAL
Qty: 180 TABLET | Refills: 0 | Status: SHIPPED | OUTPATIENT
Start: 2018-07-31 | End: 2018-09-23 | Stop reason: SDUPTHER

## 2018-07-31 RX ORDER — LEVOCETIRIZINE DIHYDROCHLORIDE 5 MG/1
5 TABLET, FILM COATED ORAL EVERY EVENING
Qty: 90 TABLET | Refills: 0 | Status: SHIPPED | OUTPATIENT
Start: 2018-07-31 | End: 2018-09-23 | Stop reason: SDUPTHER

## 2018-08-10 ENCOUNTER — LAB (OUTPATIENT)
Dept: LAB | Facility: HOSPITAL | Age: 48
End: 2018-08-10

## 2018-08-10 ENCOUNTER — OFFICE VISIT (OUTPATIENT)
Dept: FAMILY MEDICINE CLINIC | Facility: CLINIC | Age: 48
End: 2018-08-10

## 2018-08-10 VITALS
HEART RATE: 76 BPM | BODY MASS INDEX: 26.44 KG/M2 | DIASTOLIC BLOOD PRESSURE: 82 MMHG | TEMPERATURE: 97.7 F | SYSTOLIC BLOOD PRESSURE: 120 MMHG | WEIGHT: 164.5 LBS | OXYGEN SATURATION: 99 % | HEIGHT: 66 IN

## 2018-08-10 DIAGNOSIS — F41.9 ANXIETY AND DEPRESSION: ICD-10-CM

## 2018-08-10 DIAGNOSIS — M51.27 DISPLACEMENT OF INTERVERTEBRAL DISC OF LUMBOSACRAL REGION: ICD-10-CM

## 2018-08-10 DIAGNOSIS — Z23 IMMUNIZATION DUE: ICD-10-CM

## 2018-08-10 DIAGNOSIS — I10 ESSENTIAL HYPERTENSION: ICD-10-CM

## 2018-08-10 DIAGNOSIS — Z00.00 GENERAL MEDICAL EXAM: ICD-10-CM

## 2018-08-10 DIAGNOSIS — Z00.00 GENERAL MEDICAL EXAM: Primary | ICD-10-CM

## 2018-08-10 DIAGNOSIS — M51.26 LUMBAR DISCOGENIC PAIN SYNDROME: ICD-10-CM

## 2018-08-10 DIAGNOSIS — E66.3 OVERWEIGHT (BMI 25.0-29.9): ICD-10-CM

## 2018-08-10 DIAGNOSIS — F32.A ANXIETY AND DEPRESSION: ICD-10-CM

## 2018-08-10 LAB
ANION GAP SERPL CALCULATED.3IONS-SCNC: 4 MMOL/L (ref 3–11)
ARTICHOKE IGE QN: 104 MG/DL (ref 0–130)
BASOPHILS # BLD AUTO: 0.07 10*3/MM3 (ref 0–0.2)
BASOPHILS NFR BLD AUTO: 1.1 % (ref 0–1)
BUN BLD-MCNC: 19 MG/DL (ref 9–23)
BUN/CREAT SERPL: 20.2 (ref 7–25)
CALCIUM SPEC-SCNC: 9.4 MG/DL (ref 8.7–10.4)
CHLORIDE SERPL-SCNC: 106 MMOL/L (ref 99–109)
CHOLEST SERPL-MCNC: 200 MG/DL (ref 0–200)
CO2 SERPL-SCNC: 29 MMOL/L (ref 20–31)
CREAT BLD-MCNC: 0.94 MG/DL (ref 0.6–1.3)
DEPRECATED RDW RBC AUTO: 44.3 FL (ref 37–54)
EOSINOPHIL # BLD AUTO: 0.11 10*3/MM3 (ref 0–0.3)
EOSINOPHIL NFR BLD AUTO: 1.8 % (ref 0–3)
ERYTHROCYTE [DISTWIDTH] IN BLOOD BY AUTOMATED COUNT: 13.4 % (ref 11.3–14.5)
GFR SERPL CREATININE-BSD FRML MDRD: 63 ML/MIN/1.73
GLUCOSE BLD-MCNC: 98 MG/DL (ref 70–100)
HCT VFR BLD AUTO: 38.2 % (ref 34.5–44)
HDLC SERPL-MCNC: 82 MG/DL (ref 40–60)
HGB BLD-MCNC: 12.3 G/DL (ref 11.5–15.5)
IMM GRANULOCYTES # BLD: 0.01 10*3/MM3 (ref 0–0.03)
IMM GRANULOCYTES NFR BLD: 0.2 % (ref 0–0.6)
LYMPHOCYTES # BLD AUTO: 2.91 10*3/MM3 (ref 0.6–4.8)
LYMPHOCYTES NFR BLD AUTO: 46.3 % (ref 24–44)
MCH RBC QN AUTO: 29.1 PG (ref 27–31)
MCHC RBC AUTO-ENTMCNC: 32.2 G/DL (ref 32–36)
MCV RBC AUTO: 90.3 FL (ref 80–99)
MONOCYTES # BLD AUTO: 0.41 10*3/MM3 (ref 0–1)
MONOCYTES NFR BLD AUTO: 6.5 % (ref 0–12)
NEUTROPHILS # BLD AUTO: 2.78 10*3/MM3 (ref 1.5–8.3)
NEUTROPHILS NFR BLD AUTO: 44.3 % (ref 41–71)
PLATELET # BLD AUTO: 301 10*3/MM3 (ref 150–450)
PMV BLD AUTO: 10.3 FL (ref 6–12)
POTASSIUM BLD-SCNC: 4.5 MMOL/L (ref 3.5–5.5)
RBC # BLD AUTO: 4.23 10*6/MM3 (ref 3.89–5.14)
SODIUM BLD-SCNC: 139 MMOL/L (ref 132–146)
TRIGL SERPL-MCNC: 108 MG/DL (ref 0–150)
TSH SERPL DL<=0.05 MIU/L-ACNC: 1.58 MIU/ML (ref 0.35–5.35)
WBC NRBC COR # BLD: 6.28 10*3/MM3 (ref 3.5–10.8)

## 2018-08-10 PROCEDURE — 80048 BASIC METABOLIC PNL TOTAL CA: CPT

## 2018-08-10 PROCEDURE — 84443 ASSAY THYROID STIM HORMONE: CPT

## 2018-08-10 PROCEDURE — 99396 PREV VISIT EST AGE 40-64: CPT | Performed by: PHYSICIAN ASSISTANT

## 2018-08-10 PROCEDURE — 80061 LIPID PANEL: CPT

## 2018-08-10 PROCEDURE — 85025 COMPLETE CBC W/AUTO DIFF WBC: CPT

## 2018-08-10 PROCEDURE — 36415 COLL VENOUS BLD VENIPUNCTURE: CPT

## 2018-08-10 RX ORDER — LOSARTAN POTASSIUM 50 MG/1
50 TABLET ORAL DAILY
Qty: 90 TABLET | Refills: 3 | Status: SHIPPED | OUTPATIENT
Start: 2018-08-10 | End: 2019-03-22 | Stop reason: SDUPTHER

## 2018-08-10 NOTE — PROGRESS NOTES
Subjective   Violet Barrera is a 48 y.o. female  Anxiety (Follow up anxiety, req refills on alprazolam ); Back Pain (Follow up back pain, req refills on Gabapentin ); Hypertension (Follow up BP, req refills on losartan ); and Annual Exam      History of Present Illness  Patient presents today for a preventive medical visit.  Patient is here to determine screening labs and tests that are due and to determine immunization status as well.  Patient will be counseled regarding preventative medicine issues such as regular exercise and  healthy diet as well.  Patient is currently doing very well overall she is still working for the [employer], states reactive and sleeps very well at night, mood is good, activity is good and pain is well-controlled on current medication regimen.    The following portions of the patient's history were reviewed and updated as appropriate: allergies, current medications, past social history and problem list    Review of Systems   Constitutional: Negative.    HENT: Negative.    Eyes: Negative.    Respiratory: Negative.    Cardiovascular: Negative.    Gastrointestinal: Negative.    Endocrine: Negative.    Genitourinary: Negative.    Musculoskeletal: Negative.    Skin: Negative.    Allergic/Immunologic: Negative.    Neurological: Negative.    Hematological: Negative.    Psychiatric/Behavioral: Negative.    All other systems reviewed and are negative.      Objective     Vitals:    08/10/18 0812   BP: 120/82   Pulse: 76   Temp: 97.7 °F (36.5 °C)   SpO2: 99%       Physical Exam   Constitutional: She is oriented to person, place, and time. She appears well-developed and well-nourished.   HENT:   Head: Normocephalic and atraumatic.   Right Ear: External ear normal.   Left Ear: External ear normal.   Nose: Nose normal.   Mouth/Throat: Oropharynx is clear and moist.   Eyes: Pupils are equal, round, and reactive to light. Conjunctivae and EOM are normal.   Neck: Normal range of motion. Neck supple.  No JVD present. Carotid bruit is not present. No thyromegaly present.   Cardiovascular: Normal rate, regular rhythm, normal heart sounds and intact distal pulses.    No murmur heard.  Pulmonary/Chest: Effort normal and breath sounds normal.   Abdominal: Soft. Bowel sounds are normal. She exhibits no mass. There is no tenderness.   Musculoskeletal: Normal range of motion. She exhibits no edema.   Lymphadenopathy:     She has no cervical adenopathy.   Neurological: She is alert and oriented to person, place, and time. She has normal reflexes. No cranial nerve deficit.   Skin: Skin is warm and dry.   Psychiatric: She has a normal mood and affect.   Nursing note and vitals reviewed.    Discussed preventative medicine issues with patient including regular exercise, healthy diet, stress reduction, adequate sleep and recommended age-appropriate screening studies.  Assessment/Plan     Diagnoses and all orders for this visit:    General medical exam  -     Basic Metabolic Panel; Future  -     CBC & Differential; Future  -     TSH; Future  -     Lipid Panel; Future    Essential hypertension  -     losartan (COZAAR) 50 MG tablet; Take 1 tablet by mouth Daily.  -     Basic Metabolic Panel; Future  -     CBC & Differential; Future  -     TSH; Future  -     Lipid Panel; Future    Displacement of intervertebral disc of lumbosacral region    Lumbar discogenic pain syndrome    Anxiety and depression    Overweight (BMI 25.0-29.9)    Immunization due  -     Tdap Vaccine Greater Than or Equal To 8yo IM    Refill given on gabapentin 600 mg 14 times daily #360 with 3 refills to be faxed to her mail order pharmacy for chronic back pain with radiculopathy.  Refilled Xanax for 6 months and phentermine ×6 months, follow-up in office in 6 months for recheck, discussed importance of continuing to follow high-protein, low carb and low calorie diet.

## 2018-09-23 DIAGNOSIS — I10 ESSENTIAL HYPERTENSION: ICD-10-CM

## 2018-09-23 DIAGNOSIS — E66.3 OVERWEIGHT (BMI 25.0-29.9): ICD-10-CM

## 2018-09-24 RX ORDER — LEVOCETIRIZINE DIHYDROCHLORIDE 5 MG/1
5 TABLET, FILM COATED ORAL EVERY EVENING
Qty: 90 TABLET | Refills: 0 | Status: SHIPPED | OUTPATIENT
Start: 2018-09-24 | End: 2018-11-17 | Stop reason: SDUPTHER

## 2018-09-24 RX ORDER — TOPIRAMATE 25 MG/1
TABLET ORAL
Qty: 270 TABLET | Refills: 0 | Status: SHIPPED | OUTPATIENT
Start: 2018-09-24 | End: 2018-10-23 | Stop reason: SDUPTHER

## 2018-09-24 RX ORDER — HYDROCHLOROTHIAZIDE 25 MG/1
TABLET ORAL
Qty: 180 TABLET | Refills: 0 | Status: SHIPPED | OUTPATIENT
Start: 2018-09-24 | End: 2018-11-17 | Stop reason: SDUPTHER

## 2018-10-10 ENCOUNTER — TRANSCRIBE ORDERS (OUTPATIENT)
Dept: ADMINISTRATIVE | Facility: HOSPITAL | Age: 48
End: 2018-10-10

## 2018-10-10 DIAGNOSIS — Z12.31 VISIT FOR SCREENING MAMMOGRAM: Primary | ICD-10-CM

## 2018-10-23 ENCOUNTER — OFFICE VISIT (OUTPATIENT)
Dept: FAMILY MEDICINE CLINIC | Facility: CLINIC | Age: 48
End: 2018-10-23

## 2018-10-23 VITALS
OXYGEN SATURATION: 99 % | HEIGHT: 66 IN | DIASTOLIC BLOOD PRESSURE: 74 MMHG | TEMPERATURE: 97.8 F | SYSTOLIC BLOOD PRESSURE: 126 MMHG | HEART RATE: 80 BPM | BODY MASS INDEX: 27.93 KG/M2 | WEIGHT: 173.8 LBS

## 2018-10-23 DIAGNOSIS — M51.27 DISPLACEMENT OF INTERVERTEBRAL DISC OF LUMBOSACRAL REGION: ICD-10-CM

## 2018-10-23 DIAGNOSIS — Z23 IMMUNIZATION DUE: ICD-10-CM

## 2018-10-23 DIAGNOSIS — E66.3 OVERWEIGHT (BMI 25.0-29.9): ICD-10-CM

## 2018-10-23 DIAGNOSIS — N95.1 MENOPAUSAL AND FEMALE CLIMACTERIC STATES: Primary | ICD-10-CM

## 2018-10-23 DIAGNOSIS — M54.10 RADICULOPATHY WITH LOWER EXTREMITY SYMPTOMS: ICD-10-CM

## 2018-10-23 DIAGNOSIS — M51.26 LUMBAR DISCOGENIC PAIN SYNDROME: ICD-10-CM

## 2018-10-23 PROCEDURE — 99214 OFFICE O/P EST MOD 30 MIN: CPT | Performed by: PHYSICIAN ASSISTANT

## 2018-10-23 RX ORDER — ESTRADIOL 0.03 MG/D
1 FILM, EXTENDED RELEASE TRANSDERMAL 2 TIMES WEEKLY
Qty: 8 PATCH | Refills: 11 | Status: SHIPPED | OUTPATIENT
Start: 2018-10-25 | End: 2018-11-15 | Stop reason: DRUGHIGH

## 2018-10-23 RX ORDER — TOPIRAMATE 25 MG/1
25 TABLET ORAL DAILY
Qty: 270 TABLET | Refills: 3 | Status: SHIPPED | OUTPATIENT
Start: 2018-10-23 | End: 2018-10-30 | Stop reason: SDUPTHER

## 2018-10-23 NOTE — PROGRESS NOTES
Subjective   Violet Barrera is a 48 y.o. female  Weight Check (follow up on weight req refill on Phentermine and Topirmate ); Peripheral Neuropathy (follow up on neuropathy, req refill on Gabapentin ); and Immunizations (req Hep A and Flu shot )      History of Present Illness  Patient comes in today for follow-up on 2 chronic medical problems, one of which is currently stable and one uncontrolled and needing refills on medications both follow-up on weight check in association with history of obesity and currently overweight treated with phentermine and Topamax for assistance in weight control and appetite control to decrease caloric intake, weight is up today compared to her last visit but she is doing well on these medications.  She states that she's had a lot of scheduling issues where she has had more difficulty controlling her diet lately but continues to find great benefit in these medications helping her to curb her appetite and to decrease her daily caloric intake.  No side effects noted with either medication.  She is also here for refill on gabapentin that she takes daily for radiculopathy/peripheral neuropathy associated with lumbar discogenic pain syndrome.  She is currently stable on gabapentin twice daily, she is working full-time for [employer] and is able to carry out a full day's work as well as take care of her daily activities at home with the assistance of this medication.  Weight control is helped immensely with her chronic back pain as well.  She also has a new problem which is uncontrolled she states she's continuing to suffer from hot flushes that appear to be menopausal.  She is status post hysterectomy from greater than 10 years ago.  She states proximal year ago she started having occasional hot flashes especially at nighttime now she suffers from both during the daytime and at nighttime and has been trying over-the-counter herbal supplements for the past 6 months without any  significant relief.  2 of her sisters use Vivelle-Dot further menopausal hot flushes and she would like to try this medication.  She has no history of clotting disorders or breast disease, she is up-to-date on mammogram.  The following portions of the patient's history were reviewed and updated as appropriate: allergies, current medications, past social history and problem list    Review of Systems   Constitutional: Positive for activity change and unexpected weight change. Negative for appetite change and fever.   Respiratory: Negative.    Cardiovascular: Negative for chest pain.   Gastrointestinal: Negative.  Negative for abdominal distention, abdominal pain, diarrhea and nausea.   Endocrine: Positive for heat intolerance. Negative for cold intolerance, polydipsia, polyphagia and polyuria.   Genitourinary: Negative.    Musculoskeletal: Positive for back pain ( Chronically stable on medication). Negative for arthralgias, gait problem and myalgias.   Neurological: Positive for numbness ( Chronically stable on medication). Negative for dizziness, tremors and weakness.   Psychiatric/Behavioral: Negative.  Negative for dysphoric mood. The patient is not nervous/anxious.        Objective     Vitals:    10/23/18 0837   BP: 126/74   Pulse: 80   Temp: 97.8 °F (36.6 °C)   SpO2: 99%       Physical Exam   Constitutional: She is oriented to person, place, and time. She appears well-developed and well-nourished. No distress.   HENT:   Head: Normocephalic and atraumatic.   Right Ear: Hearing and tympanic membrane normal.   Left Ear: Hearing and tympanic membrane normal.   Eyes: Pupils are equal, round, and reactive to light.   Neck: Normal carotid pulses and no JVD present. Carotid bruit is not present.   Cardiovascular: Normal rate, regular rhythm, normal heart sounds and intact distal pulses.    No murmur heard.  Pulmonary/Chest: Effort normal and breath sounds normal. No respiratory distress. She exhibits no tenderness.    Abdominal: Soft. She exhibits no distension. There is no tenderness.   Musculoskeletal: Normal range of motion. She exhibits no edema, tenderness or deformity.   Neurological: She is alert and oriented to person, place, and time. She displays normal reflexes. No cranial nerve deficit or sensory deficit. She exhibits normal muscle tone. Coordination normal.   Skin: Skin is warm and dry. No rash noted. She is not diaphoretic. No erythema. No pallor.   Psychiatric: She has a normal mood and affect. Her behavior is normal. Judgment and thought content normal.   Nursing note and vitals reviewed.      Assessment/Plan     Diagnoses and all orders for this visit:    Menopausal and female climacteric states  -     estradiol (VIVELLE-DOT) 0.025 MG/24HR patch; Place 1 patch on the skin as directed by provider 2 (Two) Times a Week.    Immunization due  -     Hepatitis A Vaccine Adult IM  -     Fluarix/Fluzone/Afluria Quad/FluLaval Quad    Overweight (BMI 25.0-29.9)  -     topiramate (TOPAMAX) 25 MG tablet; Take 1 tablet by mouth Daily.    Lumbar discogenic pain syndrome    Displacement of intervertebral disc of lumbosacral region    Radiculopathy with lower extremity symptoms    Refilled gabapentin 600 mg 2 twice a day #120 to take to her local pharmacy and a second prescription for 3 and her 60 tablets with 1 refill to be faxed to her mail-order pharmacy, discussed abuse potential this medication, Tommy reviewed, appropriate, follow-up in office in 6 months for recheck.  Refilled phentermine 37.5 mg tablets one daily #30 with 1 refill, discussed abuse potential this medication and need to follow a high-protein low-carb diet with adequate water daily.  Follow-up in office in 2 months for recheck.  New perception given for Vivelle-Dot patient will follow-up if unimproved after one month otherwise follow-up in 6 months for recheck.  Immunizations updated today.

## 2018-10-30 ENCOUNTER — TELEPHONE (OUTPATIENT)
Dept: FAMILY MEDICINE CLINIC | Facility: CLINIC | Age: 48
End: 2018-10-30

## 2018-10-30 DIAGNOSIS — E66.3 OVERWEIGHT (BMI 25.0-29.9): ICD-10-CM

## 2018-10-30 RX ORDER — TOPIRAMATE 25 MG/1
25 TABLET ORAL 2 TIMES DAILY
Qty: 180 TABLET | Refills: 3 | Status: SHIPPED | OUTPATIENT
Start: 2018-10-30 | End: 2019-05-13

## 2018-10-30 NOTE — TELEPHONE ENCOUNTER
----- Message from Emely Woodson sent at 10/30/2018 10:53 AM EDT -----  Contact: [facility] [phone] REF # [id number]  NEED CLARIFICATION ON DECREASED DOSING, NEED TO VERIFY BEFORE FILLING    topiramate (TOPAMAX) 25 MG tablet

## 2018-11-08 ENCOUNTER — APPOINTMENT (OUTPATIENT)
Dept: MAMMOGRAPHY | Facility: HOSPITAL | Age: 48
End: 2018-11-08

## 2018-11-13 ENCOUNTER — HOSPITAL ENCOUNTER (OUTPATIENT)
Dept: MAMMOGRAPHY | Facility: HOSPITAL | Age: 48
Discharge: HOME | End: 2018-11-13
Admitting: OBSTETRICS & GYNECOLOGY

## 2018-11-13 DIAGNOSIS — Z12.31 VISIT FOR SCREENING MAMMOGRAM: ICD-10-CM

## 2018-11-13 PROCEDURE — 77063 BREAST TOMOSYNTHESIS BI: CPT

## 2018-11-13 PROCEDURE — 77067 SCR MAMMO BI INCL CAD: CPT | Performed by: RADIOLOGY

## 2018-11-13 PROCEDURE — 77067 SCR MAMMO BI INCL CAD: CPT

## 2018-11-13 PROCEDURE — 77063 BREAST TOMOSYNTHESIS BI: CPT | Performed by: RADIOLOGY

## 2018-11-15 ENCOUNTER — TELEPHONE (OUTPATIENT)
Dept: FAMILY MEDICINE CLINIC | Facility: CLINIC | Age: 48
End: 2018-11-15

## 2018-11-15 DIAGNOSIS — N95.1 MENOPAUSAL AND FEMALE CLIMACTERIC STATES: ICD-10-CM

## 2018-11-15 RX ORDER — ESTRADIOL 0.05 MG/D
FILM, EXTENDED RELEASE TRANSDERMAL
Qty: 8 PATCH | Refills: 11 | Status: SHIPPED | OUTPATIENT
Start: 2018-11-15 | End: 2018-11-16 | Stop reason: SDUPTHER

## 2018-11-16 RX ORDER — ESTRADIOL 0.05 MG/D
FILM, EXTENDED RELEASE TRANSDERMAL
Qty: 24 PATCH | Refills: 3 | Status: SHIPPED | OUTPATIENT
Start: 2018-11-16 | End: 2019-08-26 | Stop reason: SDUPTHER

## 2018-11-17 DIAGNOSIS — I10 ESSENTIAL HYPERTENSION: ICD-10-CM

## 2018-11-19 RX ORDER — HYDROCHLOROTHIAZIDE 25 MG/1
TABLET ORAL
Qty: 180 TABLET | Refills: 0 | Status: SHIPPED | OUTPATIENT
Start: 2018-11-19 | End: 2019-01-19 | Stop reason: SDUPTHER

## 2018-11-19 RX ORDER — LEVOCETIRIZINE DIHYDROCHLORIDE 5 MG/1
5 TABLET, FILM COATED ORAL EVERY EVENING
Qty: 90 TABLET | Refills: 0 | Status: SHIPPED | OUTPATIENT
Start: 2018-11-19 | End: 2019-01-19 | Stop reason: SDUPTHER

## 2018-11-21 ENCOUNTER — TELEPHONE (OUTPATIENT)
Dept: FAMILY MEDICINE CLINIC | Facility: CLINIC | Age: 48
End: 2018-11-21

## 2018-11-21 NOTE — TELEPHONE ENCOUNTER
Faxed over xanax to [facility] and it went through as a success.  I tried to call patient about the gabapentin, but it looks like she might have taken the script with her.  I do not see where it has been faxed.  I left a message for her to call me back.

## 2018-11-21 NOTE — TELEPHONE ENCOUNTER
Shweta, my note last month reflects that we were supposed to have faxed a prescription for 3 months worth of her gabapentin already, please check to see if that was done.  I will write out a prescription for the Xanax to be faxed.

## 2018-11-21 NOTE — TELEPHONE ENCOUNTER
Pt last seen on 10/23/18 for menopause and back pain.  Do you want to authorize refills for Xanax for her

## 2018-11-21 NOTE — TELEPHONE ENCOUNTER
----- Message from Muriel Sandoval sent at 2018 10:27 AM EST -----  Contact: [facility] MAILORDER, MEREDITH, [phone] REF. #: [id number]  PT. SEE'S DR. MCKEON.  PT. IS NEEDING REFILLS ON HER: ALPRAZolam (XANAX) 0.5 MG tablet   2016    Si.5 mg Daily As Needed (1-2 po prn).     & GABAPENTIN, 600 MG.    RX=[facility].    CONTACT MEREDITH IF NEEDING MORE INFO.

## 2019-01-19 DIAGNOSIS — I10 ESSENTIAL HYPERTENSION: ICD-10-CM

## 2019-01-21 RX ORDER — HYDROCHLOROTHIAZIDE 25 MG/1
TABLET ORAL
Qty: 180 TABLET | Refills: 1 | Status: SHIPPED | OUTPATIENT
Start: 2019-01-21 | End: 2019-05-24 | Stop reason: SDUPTHER

## 2019-01-21 RX ORDER — LEVOCETIRIZINE DIHYDROCHLORIDE 5 MG/1
5 TABLET, FILM COATED ORAL EVERY EVENING
Qty: 90 TABLET | Refills: 1 | Status: SHIPPED | OUTPATIENT
Start: 2019-01-21 | End: 2019-03-22 | Stop reason: SDUPTHER

## 2019-03-22 DIAGNOSIS — I10 ESSENTIAL HYPERTENSION: ICD-10-CM

## 2019-03-22 RX ORDER — LOSARTAN POTASSIUM 50 MG/1
50 TABLET ORAL DAILY
Qty: 90 TABLET | Refills: 3 | Status: SHIPPED | OUTPATIENT
Start: 2019-03-22 | End: 2019-05-24 | Stop reason: SDUPTHER

## 2019-03-22 RX ORDER — LEVOCETIRIZINE DIHYDROCHLORIDE 5 MG/1
5 TABLET, FILM COATED ORAL EVERY EVENING
Qty: 90 TABLET | Refills: 1 | Status: SHIPPED | OUTPATIENT
Start: 2019-03-22 | End: 2019-05-13

## 2019-03-25 RX ORDER — BUPROPION HYDROCHLORIDE 300 MG/1
300 TABLET ORAL EVERY MORNING
Qty: 90 TABLET | Refills: 3 | Status: SHIPPED | OUTPATIENT
Start: 2019-03-25 | End: 2019-05-24 | Stop reason: SDUPTHER

## 2019-05-08 RX ORDER — BUPROPION HYDROCHLORIDE 300 MG/1
300 TABLET ORAL EVERY MORNING
Qty: 90 TABLET | Refills: 3 | OUTPATIENT
Start: 2019-05-08

## 2019-05-13 ENCOUNTER — LAB (OUTPATIENT)
Dept: LAB | Facility: HOSPITAL | Age: 49
End: 2019-05-13

## 2019-05-13 ENCOUNTER — HOSPITAL ENCOUNTER (OUTPATIENT)
Dept: GENERAL RADIOLOGY | Facility: HOSPITAL | Age: 49
Discharge: HOME | End: 2019-05-13
Admitting: PHYSICIAN ASSISTANT

## 2019-05-13 ENCOUNTER — OFFICE VISIT (OUTPATIENT)
Dept: FAMILY MEDICINE CLINIC | Facility: CLINIC | Age: 49
End: 2019-05-13

## 2019-05-13 VITALS
WEIGHT: 183 LBS | TEMPERATURE: 98.4 F | HEIGHT: 66 IN | OXYGEN SATURATION: 99 % | BODY MASS INDEX: 29.41 KG/M2 | SYSTOLIC BLOOD PRESSURE: 140 MMHG | HEART RATE: 71 BPM | DIASTOLIC BLOOD PRESSURE: 82 MMHG

## 2019-05-13 DIAGNOSIS — R07.89 CHEST TIGHTNESS: ICD-10-CM

## 2019-05-13 DIAGNOSIS — R06.00 DYSPNEA, UNSPECIFIED TYPE: ICD-10-CM

## 2019-05-13 DIAGNOSIS — R53.83 FATIGUE, UNSPECIFIED TYPE: ICD-10-CM

## 2019-05-13 DIAGNOSIS — I10 ESSENTIAL HYPERTENSION: ICD-10-CM

## 2019-05-13 DIAGNOSIS — R06.00 DYSPNEA, UNSPECIFIED TYPE: Primary | ICD-10-CM

## 2019-05-13 LAB
ALBUMIN SERPL-MCNC: 4.3 G/DL (ref 3.5–5.2)
ALBUMIN/GLOB SERPL: 1.5 G/DL
ALP SERPL-CCNC: 73 U/L (ref 39–117)
ALT SERPL W P-5'-P-CCNC: 26 U/L (ref 1–33)
ANION GAP SERPL CALCULATED.3IONS-SCNC: 12.4 MMOL/L
AST SERPL-CCNC: 29 U/L (ref 1–32)
BASOPHILS # BLD AUTO: 0.08 10*3/MM3 (ref 0–0.2)
BASOPHILS NFR BLD AUTO: 1.3 % (ref 0–1.5)
BILIRUB SERPL-MCNC: 0.3 MG/DL (ref 0.2–1.2)
BUN BLD-MCNC: 14 MG/DL (ref 6–20)
BUN/CREAT SERPL: 16.5 (ref 7–25)
CALCIUM SPEC-SCNC: 10 MG/DL (ref 8.6–10.5)
CHLORIDE SERPL-SCNC: 99 MMOL/L (ref 98–107)
CO2 SERPL-SCNC: 26.6 MMOL/L (ref 22–29)
CREAT BLD-MCNC: 0.85 MG/DL (ref 0.57–1)
DEPRECATED RDW RBC AUTO: 46.6 FL (ref 37–54)
EOSINOPHIL # BLD AUTO: 0.16 10*3/MM3 (ref 0–0.4)
EOSINOPHIL NFR BLD AUTO: 2.5 % (ref 0.3–6.2)
ERYTHROCYTE [DISTWIDTH] IN BLOOD BY AUTOMATED COUNT: 13.5 % (ref 12.3–15.4)
GFR SERPL CREATININE-BSD FRML MDRD: 71 ML/MIN/1.73
GLOBULIN UR ELPH-MCNC: 2.9 GM/DL
GLUCOSE BLD-MCNC: 90 MG/DL (ref 65–99)
HCT VFR BLD AUTO: 40.1 % (ref 34–46.6)
HGB BLD-MCNC: 12.4 G/DL (ref 12–15.9)
IMM GRANULOCYTES # BLD AUTO: 0.02 10*3/MM3 (ref 0–0.05)
IMM GRANULOCYTES NFR BLD AUTO: 0.3 % (ref 0–0.5)
LYMPHOCYTES # BLD AUTO: 2.34 10*3/MM3 (ref 0.7–3.1)
LYMPHOCYTES NFR BLD AUTO: 36.9 % (ref 19.6–45.3)
MCH RBC QN AUTO: 29.3 PG (ref 26.6–33)
MCHC RBC AUTO-ENTMCNC: 30.9 G/DL (ref 31.5–35.7)
MCV RBC AUTO: 94.8 FL (ref 79–97)
MONOCYTES # BLD AUTO: 0.58 10*3/MM3 (ref 0.1–0.9)
MONOCYTES NFR BLD AUTO: 9.1 % (ref 5–12)
NEUTROPHILS # BLD AUTO: 3.17 10*3/MM3 (ref 1.7–7)
NEUTROPHILS NFR BLD AUTO: 49.9 % (ref 42.7–76)
NRBC BLD AUTO-RTO: 0 /100 WBC (ref 0–0.2)
PLATELET # BLD AUTO: 294 10*3/MM3 (ref 140–450)
PMV BLD AUTO: 11.3 FL (ref 6–12)
POTASSIUM BLD-SCNC: 4.2 MMOL/L (ref 3.5–5.2)
PROT SERPL-MCNC: 7.2 G/DL (ref 6–8.5)
RBC # BLD AUTO: 4.23 10*6/MM3 (ref 3.77–5.28)
SODIUM BLD-SCNC: 138 MMOL/L (ref 136–145)
WBC NRBC COR # BLD: 6.35 10*3/MM3 (ref 3.4–10.8)

## 2019-05-13 PROCEDURE — 99214 OFFICE O/P EST MOD 30 MIN: CPT | Performed by: PHYSICIAN ASSISTANT

## 2019-05-13 PROCEDURE — 71046 X-RAY EXAM CHEST 2 VIEWS: CPT

## 2019-05-13 PROCEDURE — 36415 COLL VENOUS BLD VENIPUNCTURE: CPT

## 2019-05-13 PROCEDURE — 80053 COMPREHEN METABOLIC PANEL: CPT

## 2019-05-13 PROCEDURE — 85025 COMPLETE CBC W/AUTO DIFF WBC: CPT

## 2019-05-13 PROCEDURE — 93000 ELECTROCARDIOGRAM COMPLETE: CPT | Performed by: PHYSICIAN ASSISTANT

## 2019-05-13 NOTE — PROGRESS NOTES
Subjective   Violet Castañeda is a 48 y.o. female  Shortness of Breath (SOB x 3 days, seen at [facility] Saturday ) and Med Refill (wants 90 day supply on all medication sent to [facility])      History of Present Illness  Patient comes in today concerned with chest tightness and shortness of breath that started on Friday.  She states she had a little bit of a cough Thursday night woke up with tightness and heaviness in her chest Thursday with significant shortness of breath worsened with exertion.  She is a strong family history of heart disease as her mother had her first heart attack at age 35 and  has had ongoing heart problems since then.  Patient has hypertension which has been stable.  She states she has an O2 sat machine at home and her O2 sat was down to 93%.  Has returned to normal.  Her blood pressures been good around 124/72.  States she had an echo stress test in November because of family history which was normal.  She states she was told during the stress test her heart went into left bundle branch block.  She denies any swelling.  She has no history of asthma.  States that she went to [facility] on Saturday who told her this was allergies but she states she does not have any sneezing nasal congestion or allergy symptoms.  She does not have a fever.  She feels tired.  The following portions of the patient's history were reviewed and updated as appropriate: allergies, current medications, past social history and problem list    Review of Systems   Constitutional: Positive for fatigue. Negative for appetite change, diaphoresis and unexpected weight change.   HENT: Negative for postnasal drip and sinus pressure.    Respiratory: Positive for chest tightness and shortness of breath. Negative for cough, choking and wheezing.    Cardiovascular: Negative for chest pain and leg swelling.   Gastrointestinal: Negative.    Endocrine: Negative.    Genitourinary: Negative.    Musculoskeletal: Negative.    Skin: Negative.     Neurological: Negative.        Objective     Vitals:    05/13/19 0914   BP: 140/82   Pulse: 71   Temp: 98.4 °F (36.9 °C)   SpO2: 99%       Physical Exam   Constitutional: She is oriented to person, place, and time. She appears well-developed and well-nourished. No distress.   HENT:   Head: Normocephalic and atraumatic.   No Exopthalmos   Neck: No JVD present. No thyromegaly present.   Cardiovascular: Normal rate, regular rhythm, normal heart sounds and intact distal pulses.   No murmur heard.  Pulmonary/Chest: Effort normal and breath sounds normal. No respiratory distress. She exhibits no tenderness.   Abdominal: Soft. She exhibits no distension. There is no tenderness.   Musculoskeletal: She exhibits no edema.   Lymphadenopathy:     She has no cervical adenopathy.   Neurological: She is alert and oriented to person, place, and time. No cranial nerve deficit. Coordination normal.   Skin: Skin is warm and dry. She is not diaphoretic. No erythema. No pallor.   Psychiatric: She has a normal mood and affect. Her behavior is normal. Judgment and thought content normal.   Nursing note and vitals reviewed.      ECG 12 Lead  Date/Time: 5/13/2019 4:44 PM  Performed by: Kianna Gandhi PA-C  Authorized by: Kianna Gandhi PA-C   Comparison: not compared with previous ECG   Previous ECG: no previous ECG available  Rhythm: sinus rhythm  Rate: normal  BPM: 85  Conduction: left bundle branch block  ST Segments: ST segments normal  T Waves: T waves normal  QRS axis: normal  Other: no other findings    Clinical impression: abnormal EKG  Comments: Patient states she has been told in the past that she had a left bundle branch block by her cardiologist.  She has seen a cardiologist in the past twice in clinic only because she has a strong family history of heart disease, her mother had a heart attack at age 35 and continued to have heart problems after that.  Patient states she had an echocardiogram that she had with a  stress test in November which was normal.            Assessment/Plan     Diagnoses and all orders for this visit:    Dyspnea, unspecified type  -     CBC & Differential; Future  -     Comprehensive Metabolic Panel; Future  -     XR Chest PA & Lateral; Future    Essential hypertension    Chest tightness    Fatigue, unspecified type    Other orders  -     ECG 12 Lead    We will contact patient's cardiologist to get a copy of her old EKG, explain to patient if the left bundle branch block is new over EKG is change I would recommend referral back to cardiology for further evaluation to consider cardiac catheterization or other stress test.  I have asked her to take an aspirin daily at this time.  I will contact her later this week when I am able to get notes from her cardiologist and have her labs back.  I have instructed her to go to the ER if symptoms worsen acutely.

## 2019-05-14 DIAGNOSIS — R07.89 CHEST TIGHTNESS: ICD-10-CM

## 2019-05-14 DIAGNOSIS — I44.7 LEFT BUNDLE BRANCH BLOCK: ICD-10-CM

## 2019-05-14 DIAGNOSIS — R94.31 ABNORMAL EKG: ICD-10-CM

## 2019-05-14 DIAGNOSIS — R06.09 DYSPNEA ON EXERTION: Primary | ICD-10-CM

## 2019-05-23 ENCOUNTER — OFFICE VISIT (OUTPATIENT)
Dept: FAMILY MEDICINE CLINIC | Facility: CLINIC | Age: 49
End: 2019-05-23

## 2019-05-23 VITALS
HEART RATE: 82 BPM | DIASTOLIC BLOOD PRESSURE: 76 MMHG | BODY MASS INDEX: 28.45 KG/M2 | SYSTOLIC BLOOD PRESSURE: 124 MMHG | TEMPERATURE: 98.1 F | WEIGHT: 177 LBS | HEIGHT: 66 IN | OXYGEN SATURATION: 97 %

## 2019-05-23 DIAGNOSIS — I10 ESSENTIAL HYPERTENSION: ICD-10-CM

## 2019-05-23 DIAGNOSIS — M51.26 LUMBAR DISCOGENIC PAIN SYNDROME: ICD-10-CM

## 2019-05-23 DIAGNOSIS — F32.A ANXIETY AND DEPRESSION: ICD-10-CM

## 2019-05-23 DIAGNOSIS — M54.10 RADICULOPATHY OF LEG: ICD-10-CM

## 2019-05-23 DIAGNOSIS — Z23 IMMUNIZATION DUE: Primary | ICD-10-CM

## 2019-05-23 DIAGNOSIS — F41.9 ANXIETY AND DEPRESSION: ICD-10-CM

## 2019-05-23 PROCEDURE — 99213 OFFICE O/P EST LOW 20 MIN: CPT | Performed by: PHYSICIAN ASSISTANT

## 2019-05-23 NOTE — PROGRESS NOTES
Subjective   Violet Castañeda is a 48 y.o. female  Anxiety (Follow up on Anxiety, req 90 day supply of Alprazolam and Wellbutrin to mail order ); Hypertension (Follow up BP, req 90 day supply of Losartan and HCTZ to mail order ); and Peripheral Neuropathy (Follow up on Neuropathy, req 90 day supply of Gabapentin to mail order )      History of Present Illness  Patient comes in today for follow-up on generalized anxiety disorder and depression, hypertension and peripheral neuropathy.  All chronic medical conditions are currently stable on meds and she denies any adverse effects from medications.  She is due for refills on her medications.  Additionally she needs a Tdap because she is getting ready to become a grandmother and she needs to be immunized to be around the baby.  The following portions of the patient's history were reviewed and updated as appropriate: allergies, current medications, past social history and problem list    Review of Systems   Constitutional: Negative for appetite change, fatigue and unexpected weight change.   Respiratory: Negative for cough, chest tightness and shortness of breath.    Cardiovascular: Positive for palpitations ( Patient is still occasionally noticing palpitation she has an appoint with her cardiologist tomorrow). Negative for chest pain and leg swelling.   Gastrointestinal: Negative for abdominal pain, diarrhea and nausea.   Skin: Negative for color change and rash.   Neurological: Positive for numbness ( Neuropathy stable on meds). Negative for dizziness, tremors, syncope, weakness, light-headedness and headaches.   Psychiatric/Behavioral: Negative for agitation, behavioral problems, confusion, decreased concentration, dysphoric mood, sleep disturbance and suicidal ideas. The patient is nervous/anxious ( Stable on meds).        Objective     Vitals:    05/23/19 0832   BP: 124/76   Pulse: 82   Temp: 98.1 °F (36.7 °C)   SpO2: 97%       Physical Exam   Constitutional:  She is oriented to person, place, and time. She appears well-developed and well-nourished. No distress.   Neck: No JVD present. No thyroid mass and no thyromegaly present.   Cardiovascular: Normal rate, regular rhythm, normal heart sounds and intact distal pulses.   No murmur heard.  Pulmonary/Chest: Effort normal and breath sounds normal. No respiratory distress. She exhibits no tenderness.   Abdominal: Soft. She exhibits no distension. There is no tenderness.   Musculoskeletal: She exhibits no edema.   Neurological: She is alert and oriented to person, place, and time. No cranial nerve deficit. Coordination normal.   Skin: Skin is warm and dry. She is not diaphoretic. No erythema. No pallor.   Psychiatric: She has a normal mood and affect. Her speech is normal and behavior is normal. Judgment and thought content normal. Her affect is not angry and not inappropriate. Cognition and memory are normal. She does not exhibit a depressed mood. She is attentive.   Nursing note and vitals reviewed.      Assessment/Plan     Diagnoses and all orders for this visit:    Immunization due  -     Tdap Vaccine Greater Than or Equal To 6yo IM  -     Hepatitis A Vaccine Adult IM    Essential hypertension  -     losartan (COZAAR) 50 MG tablet; Take 1 tablet by mouth Daily.  -     hydrochlorothiazide (HYDRODIURIL) 25 MG tablet; Take 2 tablets by mouth Daily.    Anxiety and depression    Lumbar discogenic pain syndrome    Radiculopathy of leg    Other orders  -     buPROPion XL (WELLBUTRIN XL) 300 MG 24 hr tablet; Take 1 tablet by mouth Every Morning.    Refills given on current dosage of gabapentin 2 tablets 3 times daily with a 3-month supply faxed to her mail order with 1 refill and her current dosage of Xanax for 3-month supply with 1 refill also faxed to her mail order pharmacy.  Follow-up in office in 6 months and as needed.

## 2019-05-24 RX ORDER — HYDROCHLOROTHIAZIDE 25 MG/1
50 TABLET ORAL DAILY
Qty: 180 TABLET | Refills: 3 | Status: SHIPPED | OUTPATIENT
Start: 2019-05-24 | End: 2020-01-03 | Stop reason: SDUPTHER

## 2019-05-24 RX ORDER — LOSARTAN POTASSIUM 50 MG/1
50 TABLET ORAL DAILY
Qty: 90 TABLET | Refills: 3 | Status: SHIPPED | OUTPATIENT
Start: 2019-05-24 | End: 2020-01-03 | Stop reason: SDUPTHER

## 2019-05-24 RX ORDER — BUPROPION HYDROCHLORIDE 300 MG/1
300 TABLET ORAL EVERY MORNING
Qty: 90 TABLET | Refills: 3 | Status: SHIPPED | OUTPATIENT
Start: 2019-05-24 | End: 2020-01-03 | Stop reason: SDUPTHER

## 2019-06-07 ENCOUNTER — OFFICE VISIT (OUTPATIENT)
Dept: FAMILY MEDICINE CLINIC | Facility: CLINIC | Age: 49
End: 2019-06-07

## 2019-06-07 VITALS
DIASTOLIC BLOOD PRESSURE: 74 MMHG | HEIGHT: 66 IN | BODY MASS INDEX: 29.09 KG/M2 | WEIGHT: 181 LBS | OXYGEN SATURATION: 99 % | TEMPERATURE: 97.9 F | SYSTOLIC BLOOD PRESSURE: 120 MMHG | HEART RATE: 88 BPM

## 2019-06-07 DIAGNOSIS — R00.2 PALPITATIONS: ICD-10-CM

## 2019-06-07 DIAGNOSIS — R07.89 CHEST TIGHTNESS: Primary | ICD-10-CM

## 2019-06-07 DIAGNOSIS — R06.09 DYSPNEA ON EXERTION: ICD-10-CM

## 2019-06-07 DIAGNOSIS — I10 ESSENTIAL HYPERTENSION: ICD-10-CM

## 2019-06-07 PROCEDURE — 99213 OFFICE O/P EST LOW 20 MIN: CPT | Performed by: PHYSICIAN ASSISTANT

## 2019-06-07 NOTE — PROGRESS NOTES
Subjective   Violet Castañeda is a 48 y.o. female  Palpitations (Req FMLA to be completed for palpitations, she is currently wearing a heart monitor for 21 days)      History of Present Illness  Patient comes in for follow-up on lightheadedness, dizziness, palpitations, shortness of breath and chest pain.  Please see previous office notes going back to May 10.  She is currently wearing an event monitor that was placed by her cardiologist.  She will have this in place until 14 June.  She is needing FMLA paperwork filled out.  She continues to have episodes about twice a day of lightheadedness associated with palpitations and shortness of breath.  The following portions of the patient's history were reviewed and updated as appropriate: allergies, current medications, past social history and problem list    Review of Systems   Constitutional: Negative for appetite change, diaphoresis and unexpected weight change.   HENT: Negative for postnasal drip and sinus pressure.    Respiratory: Positive for shortness of breath. Negative for cough, choking, chest tightness and wheezing.    Cardiovascular: Positive for chest pain and palpitations. Negative for leg swelling.   Neurological: Positive for dizziness, weakness and light-headedness. Negative for syncope.       Objective     Vitals:    06/07/19 0854   BP: 120/74   Pulse: 88   Temp: 97.9 °F (36.6 °C)   SpO2: 99%       Physical Exam   Constitutional: She appears well-developed and well-nourished. No distress.   Neck: No JVD present.   Cardiovascular: Normal rate, regular rhythm, normal heart sounds and intact distal pulses.   No murmur heard.  Pulmonary/Chest: Effort normal and breath sounds normal. No respiratory distress. She exhibits no tenderness.   Abdominal: Soft. She exhibits no distension. There is no tenderness.   Musculoskeletal: She exhibits no edema.   Skin: Skin is warm and dry. She is not diaphoretic. No erythema. No pallor.   Psychiatric: She has a  normal mood and affect.   Nursing note and vitals reviewed.      Assessment/Plan     Diagnoses and all orders for this visit:    Chest tightness    Essential hypertension    Dyspnea on exertion    Palpitations    FMLA forms filled out requesting patient be off work through June 30 to allow the event monitor to be read in cardiology to follow-up to determine diagnosis of cardiac dysrhythmia and initiate treatment.  Patient will follow-up with me on 28 June.

## 2019-06-28 ENCOUNTER — OFFICE VISIT (OUTPATIENT)
Dept: FAMILY MEDICINE CLINIC | Facility: CLINIC | Age: 49
End: 2019-06-28

## 2019-06-28 VITALS
SYSTOLIC BLOOD PRESSURE: 132 MMHG | HEART RATE: 81 BPM | HEIGHT: 66 IN | TEMPERATURE: 98.4 F | WEIGHT: 182 LBS | BODY MASS INDEX: 29.25 KG/M2 | DIASTOLIC BLOOD PRESSURE: 84 MMHG | OXYGEN SATURATION: 99 %

## 2019-06-28 DIAGNOSIS — I49.9 CARDIAC ARRHYTHMIA, UNSPECIFIED CARDIAC ARRHYTHMIA TYPE: Primary | ICD-10-CM

## 2019-06-28 DIAGNOSIS — I49.1 PAC (PREMATURE ATRIAL CONTRACTION): ICD-10-CM

## 2019-06-28 DIAGNOSIS — R00.2 PALPITATIONS: ICD-10-CM

## 2019-06-28 DIAGNOSIS — I45.4 IVCD (INTRAVENTRICULAR CONDUCTION DEFECT): ICD-10-CM

## 2019-06-28 PROCEDURE — 99213 OFFICE O/P EST LOW 20 MIN: CPT | Performed by: PHYSICIAN ASSISTANT

## 2019-06-28 RX ORDER — METOPROLOL SUCCINATE 25 MG/1
25 TABLET, EXTENDED RELEASE ORAL DAILY
Qty: 30 TABLET | Refills: 11 | Status: SHIPPED | OUTPATIENT
Start: 2019-06-28 | End: 2019-07-18 | Stop reason: ALTCHOICE

## 2019-06-28 RX ORDER — PROPRANOLOL HYDROCHLORIDE 10 MG/1
TABLET ORAL
Qty: 30 TABLET | Refills: 5 | Status: SHIPPED | OUTPATIENT
Start: 2019-06-28 | End: 2020-09-14 | Stop reason: SDUPTHER

## 2019-06-28 NOTE — PROGRESS NOTES
Subjective   Violet Castañeda is a 49 y.o. female  Palpitations (follow up on heart palptitations, wants to follow up on holter monitor report)      History of Present Illness  Patient comes back in today for follow-up on persistent palpitations.  She recently had a heart monitor which showed PACs and IVCD.  She states she is having symptoms daily sometimes awakens her at night other times during the daytime.  Symptoms last anywhere from 1 to 2 minutes up to 30 minutes.  No syncopal episodes but she states she feels somewhat lightheaded and gets nervous the longer of the symptoms go along with the palpitations.  Heart rate did get any faster than 110 on the heart monitor.  The following portions of the patient's history were reviewed and updated as appropriate: allergies, current medications, past social history and problem list    Review of Systems   Constitutional: Positive for activity change.   Respiratory: Negative.    Cardiovascular: Positive for palpitations. Negative for chest pain.   Neurological: Positive for weakness and light-headedness. Negative for dizziness and syncope.   Psychiatric/Behavioral: Positive for sleep disturbance. The patient is nervous/anxious.        Objective     Vitals:    06/28/19 0846   BP: 132/84   Pulse: 81   Temp: 98.4 °F (36.9 °C)   SpO2: 99%       Physical Exam   Constitutional: She appears well-developed and well-nourished. No distress.   HENT:   Head: Normocephalic.   No Exopthalmos   Neck: No JVD present. No thyromegaly present.   Cardiovascular: Normal rate, regular rhythm, normal heart sounds and intact distal pulses.   No murmur heard.  Pulmonary/Chest: Effort normal and breath sounds normal. No respiratory distress. She exhibits no tenderness.   Abdominal: Soft. She exhibits no distension. There is no tenderness.   Musculoskeletal: She exhibits no edema.   Lymphadenopathy:     She has no cervical adenopathy.   Skin: Skin is warm and dry. She is not diaphoretic. No  erythema. No pallor.   Psychiatric: She has a normal mood and affect. Her behavior is normal. Judgment and thought content normal.   Nursing note and vitals reviewed.      Assessment/Plan     Diagnoses and all orders for this visit:    Cardiac arrhythmia, unspecified cardiac arrhythmia type  -     TSH; Future    PAC (premature atrial contraction)    IVCD (intraventricular conduction defect)    Palpitations  -     TSH; Future    Other orders  -     metoprolol succinate XL (TOPROL XL) 25 MG 24 hr tablet; Take 1 tablet by mouth Daily. Take nightly for cardiac rhythm  -     propranolol (INDERAL) 10 MG tablet; Take one if needed for rapid heart rate    Patient off work for the next 2 weeks while adding new medication and treating symptoms, patient will follow up back with me in 2 weeks.  Sooner if any adverse effects of her medication, discussed common side effects.  If symptoms persist will refer to Dr. Calvert for further evaluation.

## 2019-07-18 ENCOUNTER — OFFICE VISIT (OUTPATIENT)
Dept: FAMILY MEDICINE CLINIC | Facility: CLINIC | Age: 49
End: 2019-07-18

## 2019-07-18 VITALS
DIASTOLIC BLOOD PRESSURE: 76 MMHG | SYSTOLIC BLOOD PRESSURE: 118 MMHG | TEMPERATURE: 98.5 F | HEART RATE: 84 BPM | OXYGEN SATURATION: 98 % | HEIGHT: 66 IN | BODY MASS INDEX: 29.51 KG/M2 | WEIGHT: 183.6 LBS

## 2019-07-18 DIAGNOSIS — J98.01 COUGH DUE TO BRONCHOSPASM: ICD-10-CM

## 2019-07-18 DIAGNOSIS — R00.2 PALPITATIONS: ICD-10-CM

## 2019-07-18 DIAGNOSIS — I49.1 PAC (PREMATURE ATRIAL CONTRACTION): Primary | ICD-10-CM

## 2019-07-18 PROCEDURE — 99213 OFFICE O/P EST LOW 20 MIN: CPT | Performed by: PHYSICIAN ASSISTANT

## 2019-07-18 RX ORDER — ALBUTEROL SULFATE 90 UG/1
1 AEROSOL, METERED RESPIRATORY (INHALATION) EVERY 4 HOURS PRN
Qty: 1 INHALER | Refills: 0 | Status: SHIPPED | OUTPATIENT
Start: 2019-07-18 | End: 2020-09-14 | Stop reason: SDUPTHER

## 2019-07-18 RX ORDER — ATENOLOL 25 MG/1
25 TABLET ORAL DAILY
Qty: 30 TABLET | Refills: 1 | Status: SHIPPED | OUTPATIENT
Start: 2019-07-18 | End: 2020-01-22

## 2019-07-19 PROBLEM — I49.1 PAC (PREMATURE ATRIAL CONTRACTION): Status: ACTIVE | Noted: 2019-07-19

## 2019-07-19 NOTE — PROGRESS NOTES
Subjective   Violet Castañeda is a 49 y.o. female  Palpitations (Follow up on palptiations, doing well on new medications ( Metoprolol and Propranolol) )      History of Present Illness  Patient comes in today for follow-up on palpitations and PACs.  She states that they have improved significantly on metoprolol.  She is only had to use propranolol twice.  She states however since starting on propranolol she is started having a cough at night and sometimes even having some wheezing.  She states she is never had this before she does not feel sick she does feels more short of breath at times.  The following portions of the patient's history were reviewed and updated as appropriate: allergies, current medications, past social history and problem list    Review of Systems   Constitutional: Negative for fatigue and unexpected weight change.   Respiratory: Positive for cough, shortness of breath and wheezing. Negative for chest tightness.    Cardiovascular: Positive for palpitations ( Improved). Negative for chest pain and leg swelling.   Gastrointestinal: Negative for nausea.   Skin: Negative for color change and rash.   Neurological: Negative for dizziness, syncope, weakness and headaches.   Psychiatric/Behavioral: Negative.        Objective     Vitals:    07/18/19 1000   BP: 118/76   Pulse: 84   Temp: 98.5 °F (36.9 °C)   SpO2: 98%       Physical Exam   Constitutional: She appears well-developed and well-nourished. No distress.   Neck: No JVD present.   Cardiovascular: Normal rate, regular rhythm, normal heart sounds and intact distal pulses.   No murmur heard.  Pulmonary/Chest: Effort normal and breath sounds normal. No respiratory distress. She exhibits no tenderness.   Abdominal: Soft. She exhibits no distension. There is no tenderness.   Musculoskeletal: She exhibits no edema.   Skin: Skin is warm and dry. She is not diaphoretic. No erythema. No pallor.   Psychiatric: She has a normal mood and affect.    Nursing note and vitals reviewed.      Assessment/Plan     Diagnoses and all orders for this visit:    PAC (premature atrial contraction)    Palpitations    Cough due to bronchospasm    Other orders  -     atenolol (TENORMIN) 25 MG tablet; Take 1 tablet by mouth Daily. For rapid heart rate  -     albuterol sulfate  (90 Base) MCG/ACT inhaler; Inhale 1 puff Every 4 (Four) Hours As Needed for Wheezing or Shortness of Air.    Discontinue metoprolol switch to atenolol discussed risks of bronchospasm with beta-blockers discussed the benefits of staying with the beta-blocker due to the significant reduction in her symptomatic PACs.  Discussed avoiding the propranolol as it is not a cardioselective beta-blocker and follow-up in office for recheck if bronchospasm/coughing persists on new beta-blocker.

## 2019-07-21 NOTE — PROGRESS NOTES
I have reviewed the notes, assessments, and/or procedures performed by Kianna Gandhi PA-C, I concur with her documentation of Violet Castañeda.

## 2019-09-06 RX ORDER — TOPIRAMATE 25 MG/1
TABLET ORAL
Qty: 180 TABLET | OUTPATIENT
Start: 2019-09-06

## 2019-09-18 RX ORDER — ESTRADIOL 0.05 MG/D
FILM, EXTENDED RELEASE TRANSDERMAL
Qty: 24 PATCH | Refills: 3 | Status: SHIPPED | OUTPATIENT
Start: 2019-09-18 | End: 2020-09-14 | Stop reason: SDUPTHER

## 2019-09-18 NOTE — TELEPHONE ENCOUNTER
Last office visit 7/18 and patient was to return to clinic x1 month. Ok to refill? No follow up appt scheduled. Please advise.

## 2019-09-23 RX ORDER — TOPIRAMATE 25 MG/1
TABLET ORAL
Qty: 180 TABLET | OUTPATIENT
Start: 2019-09-23

## 2019-10-03 RX ORDER — LEVOCETIRIZINE DIHYDROCHLORIDE 5 MG/1
5 TABLET, FILM COATED ORAL EVERY EVENING
Qty: 90 TABLET | Refills: 1 | OUTPATIENT
Start: 2019-10-03

## 2020-01-03 DIAGNOSIS — M54.42 CHRONIC BILATERAL LOW BACK PAIN WITH LEFT-SIDED SCIATICA: ICD-10-CM

## 2020-01-03 DIAGNOSIS — M54.10 RADICULOPATHY OF LEG: ICD-10-CM

## 2020-01-03 DIAGNOSIS — G89.29 CHRONIC BILATERAL LOW BACK PAIN WITH LEFT-SIDED SCIATICA: ICD-10-CM

## 2020-01-03 DIAGNOSIS — I10 ESSENTIAL HYPERTENSION: ICD-10-CM

## 2020-01-03 RX ORDER — GABAPENTIN 600 MG/1
TABLET ORAL
Qty: 360 TABLET | Refills: 3 | Status: CANCELLED | OUTPATIENT
Start: 2020-01-03

## 2020-01-08 RX ORDER — BUPROPION HYDROCHLORIDE 300 MG/1
300 TABLET ORAL EVERY MORNING
Qty: 90 TABLET | Refills: 0 | Status: SHIPPED | OUTPATIENT
Start: 2020-01-08 | End: 2020-04-06

## 2020-01-08 RX ORDER — HYDROCHLOROTHIAZIDE 25 MG/1
50 TABLET ORAL DAILY
Qty: 180 TABLET | Refills: 0 | Status: SHIPPED | OUTPATIENT
Start: 2020-01-08 | End: 2020-04-06

## 2020-01-08 RX ORDER — FEXOFENADINE HCL 180 MG/1
180 TABLET ORAL DAILY
Qty: 90 TABLET | Refills: 0 | Status: SHIPPED | OUTPATIENT
Start: 2020-01-08 | End: 2020-04-06

## 2020-01-08 RX ORDER — LOSARTAN POTASSIUM 50 MG/1
50 TABLET ORAL DAILY
Qty: 90 TABLET | Refills: 0 | Status: SHIPPED | OUTPATIENT
Start: 2020-01-08 | End: 2020-04-06

## 2020-01-22 ENCOUNTER — OFFICE VISIT (OUTPATIENT)
Dept: FAMILY MEDICINE CLINIC | Facility: CLINIC | Age: 50
End: 2020-01-22

## 2020-01-22 VITALS
HEART RATE: 81 BPM | TEMPERATURE: 98 F | DIASTOLIC BLOOD PRESSURE: 76 MMHG | OXYGEN SATURATION: 99 % | SYSTOLIC BLOOD PRESSURE: 112 MMHG | BODY MASS INDEX: 30.53 KG/M2 | HEIGHT: 66 IN | WEIGHT: 190 LBS

## 2020-01-22 DIAGNOSIS — M51.26 LUMBAR DISCOGENIC PAIN SYNDROME: ICD-10-CM

## 2020-01-22 DIAGNOSIS — F32.A ANXIETY AND DEPRESSION: ICD-10-CM

## 2020-01-22 DIAGNOSIS — G89.29 CHRONIC RIGHT SHOULDER PAIN: Primary | ICD-10-CM

## 2020-01-22 DIAGNOSIS — M25.511 CHRONIC RIGHT SHOULDER PAIN: Primary | ICD-10-CM

## 2020-01-22 DIAGNOSIS — F41.9 ANXIETY AND DEPRESSION: ICD-10-CM

## 2020-01-22 DIAGNOSIS — S46.001A INJURY OF RIGHT ROTATOR CUFF, INITIAL ENCOUNTER: ICD-10-CM

## 2020-01-22 PROCEDURE — 99214 OFFICE O/P EST MOD 30 MIN: CPT | Performed by: PHYSICIAN ASSISTANT

## 2020-01-22 NOTE — PROGRESS NOTES
Subjective   Violet Barrera is a 49 y.o. female  Med Refill (Req med refill on Gabapentin and Alprazolam ) and Shoulder Pain (increased right shoulder pain x2 months )      History of Present Illness  Patient comes in today for evaluation treatment of one new uncontrolled worsening medical problem and to chronically stable medical problems on chronic medications due for refills.  She is here for follow-up on generalized anxiety disorder and chronic peripheral neuropathy/radiculopathy associated with degenerative disc disease, stable on gabapentin and Xanax, due for refills.  She states that she has been having increasing pain and decreased range of motion of her right shoulder for the past 2 months, no particular history of injury.  Patient does have a past medical history significant for rotator cuff tear in her right shoulder repaired surgically in 2012.  She states it had completely returned to normal function and ability and was not giving her any problems until a couple of months ago.  She states daily activities are very difficult she cannot take her shirt on and off, unable to lift right shoulder laterally at all, she states the pain is keeping her up at night she is having to sleep in a recliner.  The following portions of the patient's history were reviewed and updated as appropriate: allergies, current medications, past social history and problem list    Review of Systems   Constitutional: Positive for activity change. Negative for appetite change and fatigue.   Respiratory: Negative.  Negative for chest tightness and shortness of breath.    Cardiovascular: Negative.    Gastrointestinal: Negative.  Negative for abdominal pain, diarrhea and nausea.   Genitourinary: Negative.    Musculoskeletal: Positive for arthralgias, back pain and myalgias. Negative for gait problem and joint swelling.   Skin: Negative.    Neurological: Positive for numbness. Negative for dizziness, tremors, weakness, light-headedness  and headaches.   Psychiatric/Behavioral: Positive for sleep disturbance. Negative for agitation, behavioral problems, confusion, decreased concentration, dysphoric mood and suicidal ideas. The patient is nervous/anxious.        Objective     Vitals:    01/22/20 1531   BP: 112/76   Pulse: 81   Temp: 98 °F (36.7 °C)   SpO2: 99%       Physical Exam   Constitutional: She is oriented to person, place, and time. She appears well-developed and well-nourished. No distress.   Neck: No thyroid mass and no thyromegaly present.   Cardiovascular: Normal rate, regular rhythm, normal heart sounds and intact distal pulses.   Pulmonary/Chest: Effort normal.   Musculoskeletal: She exhibits tenderness.   Markedly decreased range of motion right shoulder, unable to abduct shoulder at all laterally, able to lift anteriorly to 30 degrees, unable to externally rotate right shoulder, markedly tender at AC joint anteriorly and lateral deltoid.   Neurological: She is alert and oriented to person, place, and time. A sensory deficit is present. No cranial nerve deficit.   Skin: Skin is warm and dry. She is not diaphoretic. No erythema. No pallor.   Psychiatric: Her speech is normal and behavior is normal. Judgment and thought content normal. Her mood appears anxious. Her affect is not angry and not inappropriate. Cognition and memory are normal. She does not exhibit a depressed mood. She is attentive.   Nursing note and vitals reviewed.      Assessment/Plan     Violet was seen today for med refill and shoulder pain.    Diagnoses and all orders for this visit:    Chronic right shoulder pain  -     MRI Shoulder Right Without Contrast; Future    Injury of right rotator cuff, initial encounter  -     MRI Shoulder Right Without Contrast; Future    Anxiety and depression    Lumbar discogenic pain syndrome     + Refilled Xanax at current dosage of 0.5 mg 1-2 daily #180 for a 3-month supply with 1 refill, gabapentin 600 mg 2 twice daily #360 for  3-month supply with 1 refill.  Discussed abuse potential and controlled substance dose of medication, need to follow-up in 6 months in office for recheck.  Patient has a previous history of a rotator cuff tear repaired by [facility] orthopedics and requests referral back there if MRI is abnormal.  Will contact patient with MRI results after I receive them.

## 2020-02-04 ENCOUNTER — HOSPITAL ENCOUNTER (OUTPATIENT)
Dept: MRI IMAGING | Facility: HOSPITAL | Age: 50
Discharge: HOME | End: 2020-02-04
Admitting: PHYSICIAN ASSISTANT

## 2020-02-04 DIAGNOSIS — S46.001A INJURY OF RIGHT ROTATOR CUFF, INITIAL ENCOUNTER: ICD-10-CM

## 2020-02-04 DIAGNOSIS — G89.29 CHRONIC RIGHT SHOULDER PAIN: ICD-10-CM

## 2020-02-04 DIAGNOSIS — M25.511 CHRONIC RIGHT SHOULDER PAIN: ICD-10-CM

## 2020-02-04 PROCEDURE — 73221 MRI JOINT UPR EXTREM W/O DYE: CPT

## 2020-02-06 ENCOUNTER — TRANSCRIBE ORDERS (OUTPATIENT)
Dept: FAMILY MEDICINE CLINIC | Facility: CLINIC | Age: 50
End: 2020-02-06

## 2020-02-06 DIAGNOSIS — M25.511 ACUTE PAIN OF RIGHT SHOULDER: Primary | ICD-10-CM

## 2020-02-06 DIAGNOSIS — M75.101 TEAR OF RIGHT ROTATOR CUFF, UNSPECIFIED TEAR EXTENT, UNSPECIFIED WHETHER TRAUMATIC: ICD-10-CM

## 2020-02-21 ENCOUNTER — OFFICE VISIT (OUTPATIENT)
Dept: ORTHOPEDIC SURGERY | Facility: CLINIC | Age: 50
End: 2020-02-21

## 2020-02-21 VITALS — OXYGEN SATURATION: 99 % | HEART RATE: 80 BPM | BODY MASS INDEX: 30.26 KG/M2 | WEIGHT: 188.27 LBS | HEIGHT: 66 IN

## 2020-02-21 DIAGNOSIS — M25.511 RIGHT SHOULDER PAIN, UNSPECIFIED CHRONICITY: Primary | ICD-10-CM

## 2020-02-21 DIAGNOSIS — M75.111 NONTRAUMATIC INCOMPLETE TEAR OF RIGHT ROTATOR CUFF: ICD-10-CM

## 2020-02-21 PROCEDURE — 99203 OFFICE O/P NEW LOW 30 MIN: CPT | Performed by: ORTHOPAEDIC SURGERY

## 2020-02-21 RX ORDER — NAPROXEN 500 MG/1
500 TABLET ORAL 2 TIMES DAILY
Qty: 180 TABLET | Refills: 3 | Status: SHIPPED | OUTPATIENT
Start: 2020-02-21 | End: 2020-09-14 | Stop reason: SDUPTHER

## 2020-02-21 NOTE — PROGRESS NOTES
[facility] Note    Subjective     Chief Complaint   Patient presents with   • Right Shoulder - Pain        HPI      Violet Barrera is a 49 y.o. female who presents with right shoulder pain.  Onset: atraumatic and gradual in nature. The issue has been ongoing for 2-3 month(s). Pain is a 5/10 on the pain scale. Pain is described as aching and burning. Associated symptoms include popping, grinding and giving way/buckling. The pain is worse with sleeping; Previous treatments have included: nothing.    I have reviewed the following portions of the patient's history:History of Present Illness  She had a right shoulder rotator cuff repair for full-thickness tear because she could not lift her arm in 2013 at [facility].  It was very painful but she got back to full use of her arm until 3 months ago          Past Medical History:   Diagnosis Date   • Arthritis    • Depression    • Extremity pain    • Headache, tension-type    • Joint pain    • Low back pain    • Lumbosacral disc disease       Past Surgical History:   Procedure Laterality Date   • APPENDECTOMY     • HYSTERECTOMY  1998   • ROTATOR CUFF REPAIR        Family History   Problem Relation Age of Onset   • Heart disease Mother    • Hypertension Mother    • Cancer Father    • Hypertension Father    • Breast cancer Neg Hx    • Ovarian cancer Neg Hx      Social History     Socioeconomic History   • Marital status:      Spouse name: Not on file   • Number of children: Not on file   • Years of education: Not on file   • Highest education level: Not on file   Tobacco Use   • Smoking status: Former Smoker   • Smokeless tobacco: Never Used   Substance and Sexual Activity   • Alcohol use: Yes     Comment: occ.   • Drug use: No   • Sexual activity: Yes     Partners: Male      Current Outpatient Medications on File Prior to Visit   Medication Sig Dispense Refill   • albuterol sulfate  (90 Base) MCG/ACT inhaler Inhale 1 puff  "Every 4 (Four) Hours As Needed for Wheezing or Shortness of Air. 1 inhaler 0   • ALPRAZolam (XANAX) 0.5 MG tablet 0.5 mg Daily As Needed (1-2 po prn).     • buPROPion XL (WELLBUTRIN XL) 300 MG 24 hr tablet Take 1 tablet by mouth Every Morning. 90 tablet 0   • estradiol (VIVELLE-DOT) 0.05 MG/24HR patch APPLY 1 PATCH TWICE WEEKLY  FOR MENOPAUSAL SYMPTOMS 24 patch 3   • fexofenadine (ALLEGRA ALLERGY) 180 MG tablet Take 1 tablet by mouth Daily. 90 tablet 0   • gabapentin (NEURONTIN) 600 MG tablet Take one every 6 hours for leg pain 360 tablet 3   • hydroCHLOROthiazide (HYDRODIURIL) 25 MG tablet Take 2 tablets by mouth Daily. 180 tablet 0   • losartan (COZAAR) 50 MG tablet Take 1 tablet by mouth Daily. 90 tablet 0   • propranolol (INDERAL) 10 MG tablet Take one if needed for rapid heart rate 30 tablet 5     Current Facility-Administered Medications on File Prior to Visit   Medication Dose Route Frequency Provider Last Rate Last Dose   • cyanocobalamin injection 1,000 mcg  1,000 mcg Intramuscular Q28 Days Kinana Gandhi PA-C   1,000 mcg at 04/14/17 0904      Allergies   Allergen Reactions   • Penicillins Rash        The following portions of the patient's history were reviewed and updated as appropriate: allergies, current medications, past family history, past medical history, past social history, past surgical history and problem list.    Review of Systems   Constitutional: Positive for activity change.   Eyes: Negative.    Respiratory: Negative.    Cardiovascular: Negative.    Gastrointestinal: Negative.    Endocrine: Negative.    Genitourinary: Negative.    Musculoskeletal: Positive for neck pain.   Skin: Negative.    Allergic/Immunologic: Negative.    Neurological: Positive for headaches.   Hematological: Negative.    Psychiatric/Behavioral: Negative.         Objective      Physical Exam  Pulse 80   Ht 167.6 cm (65.98\")   Wt 85.4 kg (188 lb 4.4 oz)   SpO2 99%   BMI 30.40 kg/m²     Body mass index is 30.4 " kg/m².    GENERAL APPEARANCE: awake, alert & oriented x 3, in no acute distress and well developed, well nourished  PSYCH: normal mood and affect  LUNGS:  breathing nonlabored, no wheezing  EYES: sclera anicteric, pupils equal  CARDIOVASCULAR: palpable pulses dorsalis pedis, palpable posterior tibial bilaterally. Capillary refill less than 2 seconds  INTEGUMENTARY: skin intact, no clubbing, cyanosis  NEUROLOGIC:  Normal Sensation and reflexes       Ortho Exam  Musculoskeletal   Upper Extremity   Right Shoulder     Inspection and Palpation:     Tenderness - none    Crepitus - none    Sensation is normal    Examination reveals no ecchymosis.      Strength and Tone:    Supraspinatus,  Infraspinatus - 4/5    Subscapularis - 5/5    Deltoid - 5/5  Range of Motion   Left shoulder:    Internal Rotation: ROM - T7    External Rotation: AROM - 80 degrees    Elevation through flexion: AROM - 180 degrees    Right Shoulder:    Internal Rotation: ROM - T7    External Rotation: AROM - 80 degrees    Elevation through flexion: AROM - 80 degrees     Abduction -80 degrees  Instability   Right shoulder    Sulcus sign negative    Apprehension test negative    Lucía relocation test negative    Jerk test negative   Impingement   Right shoulder    Jones impingement test positive    Neer impingement test positive   Functional Testing   Right shoulder    AC crossover adduction test negative    Abdominal compression test negative    Lift-off sign negative    Speed's test negative    Franco's test negative    Horriblower's sign negative       Imaging/Studies  Imaging Results (Last 7 Days)     Procedure Component Value Units Date/Time    XR Shoulder 2+ View Right [[id number]] Resulted:  02/21/20 0829     Updated:  02/21/20 0830    Narrative:       Right Shoulder X-Ray  Indication: Pain  AP, scapular Y, and axillary lateral views    Findings:  No fracture  No bony lesion  Normal soft tissues  Normal joint spaces    No prior studies were available  for comparison.        I viewed her right shoulder MRI from February 4 which shows incomplete rotator cuff tear not full-thickness    Assessment/Plan        ICD-10-CM ICD-9-CM   1. Right shoulder pain, unspecified chronicity M25.511 719.41   2. Nontraumatic incomplete tear of right rotator cuff M75.111 726.13       Orders Placed This Encounter   Procedures   • XR Shoulder 2+ View Right   • Ambulatory Referral to Physical Therapy      I have ordered physical therapy.  She will do that close to home in [place].  She will follow-up in 4 weeks.  We are trying to get better without surgery.  She does not want another surgery.  She has a chance to get better with physical therapy.  I also ordered Naprosyn for her.  Medical Decision Making  Management Options : prescription/IM medicine and physical/occupational therapy  Data/Risk: radiology tests and independent visualization of imaging, lab tests, or EMG/NCV    Rommel Lopez MD  02/21/20  8:40 AM         EMR Dragon/Transcription disclaimer:  Much of this encounter note is an electronic transcription of spoken language to printed text. Electronic transcription of spoken language may permit erroneous, or at times, nonsensical words or phrases to be inadvertently transcribed. Although I have reviewed the note for such errors, some may still exist.

## 2020-04-06 DIAGNOSIS — I10 ESSENTIAL HYPERTENSION: ICD-10-CM

## 2020-04-06 RX ORDER — LOSARTAN POTASSIUM 50 MG/1
TABLET ORAL
Qty: 90 TABLET | Refills: 0 | Status: SHIPPED | OUTPATIENT
Start: 2020-04-06 | End: 2020-05-06

## 2020-04-06 RX ORDER — FEXOFENADINE HCL 180 MG/1
TABLET ORAL
Qty: 90 TABLET | Refills: 0 | Status: SHIPPED | OUTPATIENT
Start: 2020-04-06 | End: 2020-04-08

## 2020-04-06 RX ORDER — HYDROCHLOROTHIAZIDE 25 MG/1
TABLET ORAL
Qty: 180 TABLET | Refills: 0 | Status: SHIPPED | OUTPATIENT
Start: 2020-04-06 | End: 2020-05-06

## 2020-04-06 RX ORDER — BUPROPION HYDROCHLORIDE 300 MG/1
TABLET ORAL
Qty: 90 TABLET | Refills: 0 | Status: SHIPPED | OUTPATIENT
Start: 2020-04-06 | End: 2020-05-06

## 2020-04-08 RX ORDER — FEXOFENADINE HCL 180 MG/1
TABLET ORAL
Qty: 90 TABLET | Refills: 0 | Status: SHIPPED | OUTPATIENT
Start: 2020-04-08 | End: 2020-06-22

## 2020-05-06 DIAGNOSIS — I10 ESSENTIAL HYPERTENSION: ICD-10-CM

## 2020-05-06 RX ORDER — HYDROCHLOROTHIAZIDE 25 MG/1
TABLET ORAL
Qty: 180 TABLET | Refills: 0 | Status: SHIPPED | OUTPATIENT
Start: 2020-05-06 | End: 2020-06-22

## 2020-05-06 RX ORDER — BUPROPION HYDROCHLORIDE 300 MG/1
TABLET ORAL
Qty: 90 TABLET | Refills: 0 | Status: SHIPPED | OUTPATIENT
Start: 2020-05-06 | End: 2020-06-22

## 2020-05-06 RX ORDER — LOSARTAN POTASSIUM 50 MG/1
TABLET ORAL
Qty: 90 TABLET | Refills: 0 | Status: SHIPPED | OUTPATIENT
Start: 2020-05-06 | End: 2020-06-22

## 2020-06-21 DIAGNOSIS — I10 ESSENTIAL HYPERTENSION: ICD-10-CM

## 2020-06-22 RX ORDER — HYDROCHLOROTHIAZIDE 25 MG/1
TABLET ORAL
Qty: 180 TABLET | Refills: 0 | Status: SHIPPED | OUTPATIENT
Start: 2020-06-22 | End: 2020-09-14 | Stop reason: SDUPTHER

## 2020-06-22 RX ORDER — BUPROPION HYDROCHLORIDE 300 MG/1
TABLET ORAL
Qty: 90 TABLET | Refills: 0 | Status: SHIPPED | OUTPATIENT
Start: 2020-06-22 | End: 2020-09-14 | Stop reason: SDUPTHER

## 2020-06-22 RX ORDER — FEXOFENADINE HCL 180 MG/1
TABLET ORAL
Qty: 90 TABLET | Refills: 0 | Status: SHIPPED | OUTPATIENT
Start: 2020-06-22 | End: 2020-09-14 | Stop reason: SDUPTHER

## 2020-06-22 RX ORDER — LOSARTAN POTASSIUM 50 MG/1
TABLET ORAL
Qty: 90 TABLET | Refills: 0 | Status: SHIPPED | OUTPATIENT
Start: 2020-06-22 | End: 2020-09-14 | Stop reason: SDUPTHER

## 2020-08-31 RX ORDER — ALPRAZOLAM 0.5 MG/1
TABLET ORAL
Qty: 180 TABLET | Refills: 0 | OUTPATIENT
Start: 2020-08-31

## 2020-09-09 DIAGNOSIS — G89.29 CHRONIC BILATERAL LOW BACK PAIN WITH LEFT-SIDED SCIATICA: ICD-10-CM

## 2020-09-09 DIAGNOSIS — M54.10 RADICULOPATHY OF LEG: ICD-10-CM

## 2020-09-09 DIAGNOSIS — M54.42 CHRONIC BILATERAL LOW BACK PAIN WITH LEFT-SIDED SCIATICA: ICD-10-CM

## 2020-09-09 DIAGNOSIS — I10 ESSENTIAL HYPERTENSION: ICD-10-CM

## 2020-09-14 ENCOUNTER — OFFICE VISIT (OUTPATIENT)
Dept: FAMILY MEDICINE CLINIC | Facility: CLINIC | Age: 50
End: 2020-09-14

## 2020-09-14 VITALS
BODY MASS INDEX: 29.25 KG/M2 | SYSTOLIC BLOOD PRESSURE: 110 MMHG | TEMPERATURE: 97.1 F | DIASTOLIC BLOOD PRESSURE: 78 MMHG | OXYGEN SATURATION: 98 % | HEIGHT: 66 IN | WEIGHT: 182 LBS | HEART RATE: 88 BPM

## 2020-09-14 DIAGNOSIS — M54.10 RADICULOPATHY OF LEG: ICD-10-CM

## 2020-09-14 DIAGNOSIS — I10 ESSENTIAL HYPERTENSION: ICD-10-CM

## 2020-09-14 DIAGNOSIS — F32.A ANXIETY AND DEPRESSION: ICD-10-CM

## 2020-09-14 DIAGNOSIS — R00.2 PALPITATIONS: ICD-10-CM

## 2020-09-14 DIAGNOSIS — F41.9 ANXIETY AND DEPRESSION: ICD-10-CM

## 2020-09-14 DIAGNOSIS — Z00.00 GENERAL MEDICAL EXAM: Primary | ICD-10-CM

## 2020-09-14 PROCEDURE — 93000 ELECTROCARDIOGRAM COMPLETE: CPT | Performed by: PHYSICIAN ASSISTANT

## 2020-09-14 PROCEDURE — 99386 PREV VISIT NEW AGE 40-64: CPT | Performed by: PHYSICIAN ASSISTANT

## 2020-09-14 RX ORDER — GABAPENTIN 600 MG/1
TABLET ORAL
Qty: 360 TABLET | Refills: 3 | OUTPATIENT
Start: 2020-09-14

## 2020-09-14 RX ORDER — ALBUTEROL SULFATE 90 UG/1
1 AEROSOL, METERED RESPIRATORY (INHALATION) EVERY 4 HOURS PRN
Qty: 18 G | Refills: 5 | Status: SHIPPED | OUTPATIENT
Start: 2020-09-14 | End: 2021-01-29 | Stop reason: SDUPTHER

## 2020-09-14 RX ORDER — LOSARTAN POTASSIUM 50 MG/1
50 TABLET ORAL DAILY
Qty: 90 TABLET | Refills: 0 | OUTPATIENT
Start: 2020-09-14

## 2020-09-14 RX ORDER — HYDROCHLOROTHIAZIDE 25 MG/1
50 TABLET ORAL DAILY
Qty: 180 TABLET | Refills: 1 | Status: SHIPPED | OUTPATIENT
Start: 2020-09-14 | End: 2021-01-29 | Stop reason: SDUPTHER

## 2020-09-14 RX ORDER — ESTRADIOL 0.05 MG/D
FILM, EXTENDED RELEASE TRANSDERMAL
Qty: 24 PATCH | Refills: 3 | OUTPATIENT
Start: 2020-09-14

## 2020-09-14 RX ORDER — BUPROPION HYDROCHLORIDE 300 MG/1
300 TABLET ORAL EVERY MORNING
Qty: 90 TABLET | Refills: 0 | OUTPATIENT
Start: 2020-09-14

## 2020-09-14 RX ORDER — BUPROPION HYDROCHLORIDE 300 MG/1
300 TABLET ORAL EVERY MORNING
Qty: 90 TABLET | Refills: 3 | Status: SHIPPED | OUTPATIENT
Start: 2020-09-14 | End: 2021-01-29 | Stop reason: SDUPTHER

## 2020-09-14 RX ORDER — LOSARTAN POTASSIUM 50 MG/1
50 TABLET ORAL DAILY
Qty: 90 TABLET | Refills: 1 | Status: SHIPPED | OUTPATIENT
Start: 2020-09-14 | End: 2021-01-29 | Stop reason: SDUPTHER

## 2020-09-14 RX ORDER — PROPRANOLOL HYDROCHLORIDE 10 MG/1
TABLET ORAL
Qty: 90 TABLET | Refills: 1 | Status: SHIPPED | OUTPATIENT
Start: 2020-09-14 | End: 2021-01-29 | Stop reason: SDUPTHER

## 2020-09-14 RX ORDER — ALBUTEROL SULFATE 90 UG/1
1 AEROSOL, METERED RESPIRATORY (INHALATION) EVERY 4 HOURS PRN
OUTPATIENT
Start: 2020-09-14

## 2020-09-14 RX ORDER — NAPROXEN 500 MG/1
500 TABLET ORAL 2 TIMES DAILY
Qty: 180 TABLET | Refills: 3 | Status: SHIPPED | OUTPATIENT
Start: 2020-09-14 | End: 2021-01-29

## 2020-09-14 RX ORDER — ESTRADIOL 0.05 MG/D
FILM, EXTENDED RELEASE TRANSDERMAL
Qty: 24 PATCH | Refills: 3 | Status: SHIPPED | OUTPATIENT
Start: 2020-09-14 | End: 2021-01-29

## 2020-09-14 RX ORDER — HYDROCHLOROTHIAZIDE 25 MG/1
50 TABLET ORAL DAILY
Qty: 180 TABLET | Refills: 0 | OUTPATIENT
Start: 2020-09-14

## 2020-09-14 RX ORDER — FEXOFENADINE HCL 180 MG/1
180 TABLET ORAL DAILY
Qty: 90 TABLET | Refills: 0 | OUTPATIENT
Start: 2020-09-14

## 2020-09-14 RX ORDER — FEXOFENADINE HCL 180 MG/1
180 TABLET ORAL DAILY
Qty: 90 TABLET | Refills: 3 | Status: SHIPPED | OUTPATIENT
Start: 2020-09-14 | End: 2021-01-29 | Stop reason: SDUPTHER

## 2020-09-14 NOTE — PROGRESS NOTES
Subjective   Violet Barrera is a 50 y.o. female  Hypertension (having increased HR    refills on losartan and HCTZ), Depression (refills on wellbutrin), Anxiety (refills on xanax), Allergies (refills on allegra and albuterol), HRT (refills on estradiol), Back Pain (refills on gabapentin and naproxen), and Annual Exam      History of Present Illness  Patient presents today for a preventive medical visit.  Patient is here to determine screening labs and tests that are due and to determine immunization status as well.  Patient will be counseled regarding preventative medicine issues such as regular exercise and  healthy diet as well.  The following portions of the patient's history were reviewed and updated as appropriate: allergies, current medications, past social history and problem list    Review of Systems   Constitutional: Negative.    HENT: Negative.    Eyes: Negative.    Respiratory: Negative.    Cardiovascular: Positive for palpitations.        Patient has history of SVT has a cardiologist, had a full work-up including event monitor last year.  Has recently noticed increased palpitations.   Gastrointestinal: Negative.    Endocrine: Negative.    Genitourinary: Negative.    Musculoskeletal: Negative.    Skin: Negative.    Allergic/Immunologic: Negative.    Neurological: Negative.    Hematological: Negative.    Psychiatric/Behavioral: The patient is nervous/anxious.    All other systems reviewed and are negative.      Objective     Vitals:    09/14/20 1009   BP: 110/78   Pulse: 88   Temp: 97.1 °F (36.2 °C)   SpO2: 98%         Physical Exam  Vitals signs and nursing note reviewed.   Constitutional:       General: She is not in acute distress.     Appearance: Normal appearance. She is well-developed. She is not ill-appearing.   HENT:      Head: Normocephalic and atraumatic.      Right Ear: External ear normal.      Left Ear: External ear normal.      Nose: Nose normal.   Eyes:      Conjunctiva/sclera:  Conjunctivae normal.      Pupils: Pupils are equal, round, and reactive to light.   Neck:      Musculoskeletal: Normal range of motion and neck supple.      Thyroid: No thyromegaly.      Vascular: No carotid bruit or JVD.   Cardiovascular:      Rate and Rhythm: Normal rate and regular rhythm.      Heart sounds: Normal heart sounds. No murmur.   Pulmonary:      Effort: Pulmonary effort is normal.      Breath sounds: Normal breath sounds.   Abdominal:      General: Bowel sounds are normal.      Palpations: Abdomen is soft. There is no mass.      Tenderness: There is no abdominal tenderness.   Musculoskeletal: Normal range of motion.   Lymphadenopathy:      Cervical: No cervical adenopathy.   Skin:     General: Skin is warm and dry.   Neurological:      General: No focal deficit present.      Mental Status: She is alert and oriented to person, place, and time.      Cranial Nerves: No cranial nerve deficit.      Sensory: No sensory deficit.      Deep Tendon Reflexes: Reflexes are normal and symmetric.   Psychiatric:         Mood and Affect: Mood normal.         Behavior: Behavior normal.         Thought Content: Thought content normal.         Judgment: Judgment normal.         ECG 12 Lead    Date/Time: 9/14/2020 12:58 PM  Performed by: Kianna Gandhi PA-C  Authorized by: Kianna Gandhi PA-C   Comparison: compared with previous ECG from 5/13/2019  Similar to previous ECG  Rhythm: sinus rhythm  Rate: normal  BPM: 78  Conduction: left bundle branch block  ST Segments: ST segments normal  T Waves: T waves normal  QRS axis: normal  Other: no other findings    Clinical impression: abnormal EKG  Comments: Left bundle branch block as seen on previous EKG, no acute changes seen.  Discussed with patient          Discussed preventative medicine issues with patient including regular exercise, healthy diet, stress reduction, adequate sleep and recommended age-appropriate screening studies.  Assessment/Plan     Violet  was seen today for hypertension, depression, anxiety, allergies, hrt, back pain and annual exam.    Diagnoses and all orders for this visit:    General medical exam  -     Lipid Panel; Future  -     Basic metabolic panel; Future  -     TSH; Future  -     CBC (No Diff); Future    Palpitations  -     Basic metabolic panel; Future  -     TSH; Future  -     CBC (No Diff); Future    Radiculopathy of leg    Anxiety and depression    Essential hypertension  -     losartan (COZAAR) 50 MG tablet; Take 1 tablet by mouth Daily.  -     hydroCHLOROthiazide (HYDRODIURIL) 25 MG tablet; Take 2 tablets by mouth Daily.    Other orders  -     ECG 12 Lead  -     propranolol (INDERAL) 10 MG tablet; Take one if needed for rapid heart rate  -     naproxen (NAPROSYN) 500 MG tablet; Take 1 tablet by mouth 2 (Two) Times a Day.  -     fexofenadine (ALLEGRA) 180 MG tablet; Take 1 tablet by mouth Daily.  -     estradiol (Vivelle-Dot) 0.05 MG/24HR patch; APPLY 1 PATCH TWICE WEEKLY  FOR MENOPAUSAL SYMPTOMS  -     buPROPion XL (WELLBUTRIN XL) 300 MG 24 hr tablet; Take 1 tablet by mouth Every Morning.  -     albuterol sulfate  (90 Base) MCG/ACT inhaler; Inhale 1 puff Every 4 (Four) Hours As Needed for Wheezing or Shortness of Air.    Prescription refill given of current dosage of Xanax and gabapentin for 6-month supply, follow-up in 6 months for recheck.  She will follow-up with her cardiologist regarding palpitations, continue current medication dosages at this time for hypertension.  Patient will schedule her mammogram for later this year.

## 2020-09-16 ENCOUNTER — TRANSCRIBE ORDERS (OUTPATIENT)
Dept: ADMINISTRATIVE | Facility: HOSPITAL | Age: 50
End: 2020-09-16

## 2020-09-16 DIAGNOSIS — Z12.31 VISIT FOR SCREENING MAMMOGRAM: Primary | ICD-10-CM

## 2020-09-25 ENCOUNTER — LAB (OUTPATIENT)
Dept: LAB | Facility: HOSPITAL | Age: 50
End: 2020-09-25

## 2020-09-25 DIAGNOSIS — Z00.00 GENERAL MEDICAL EXAM: ICD-10-CM

## 2020-09-25 DIAGNOSIS — R00.2 PALPITATIONS: ICD-10-CM

## 2020-09-25 LAB
ANION GAP SERPL CALCULATED.3IONS-SCNC: 10.8 MMOL/L (ref 5–15)
BUN SERPL-MCNC: 13 MG/DL (ref 6–20)
BUN/CREAT SERPL: 13.5 (ref 7–25)
CALCIUM SPEC-SCNC: 8.9 MG/DL (ref 8.6–10.5)
CHLORIDE SERPL-SCNC: 106 MMOL/L (ref 98–107)
CHOLEST SERPL-MCNC: 243 MG/DL (ref 0–200)
CO2 SERPL-SCNC: 24.2 MMOL/L (ref 22–29)
CREAT SERPL-MCNC: 0.96 MG/DL (ref 0.57–1)
DEPRECATED RDW RBC AUTO: 42.2 FL (ref 37–54)
ERYTHROCYTE [DISTWIDTH] IN BLOOD BY AUTOMATED COUNT: 12.7 % (ref 12.3–15.4)
GFR SERPL CREATININE-BSD FRML MDRD: 61 ML/MIN/1.73
GLUCOSE SERPL-MCNC: 93 MG/DL (ref 65–99)
HCT VFR BLD AUTO: 39.4 % (ref 34–46.6)
HDLC SERPL-MCNC: 67 MG/DL (ref 40–60)
HGB BLD-MCNC: 12.9 G/DL (ref 12–15.9)
LDLC SERPL CALC-MCNC: 129 MG/DL (ref 0–100)
LDLC/HDLC SERPL: 1.92 {RATIO}
MCH RBC QN AUTO: 29.7 PG (ref 26.6–33)
MCHC RBC AUTO-ENTMCNC: 32.7 G/DL (ref 31.5–35.7)
MCV RBC AUTO: 90.8 FL (ref 79–97)
PLATELET # BLD AUTO: 308 10*3/MM3 (ref 140–450)
PMV BLD AUTO: 11.2 FL (ref 6–12)
POTASSIUM SERPL-SCNC: 4.2 MMOL/L (ref 3.5–5.2)
RBC # BLD AUTO: 4.34 10*6/MM3 (ref 3.77–5.28)
SODIUM SERPL-SCNC: 141 MMOL/L (ref 136–145)
TRIGL SERPL-MCNC: 236 MG/DL (ref 0–150)
TSH SERPL DL<=0.05 MIU/L-ACNC: 1.1 UIU/ML (ref 0.27–4.2)
VLDLC SERPL-MCNC: 47.2 MG/DL (ref 5–40)
WBC # BLD AUTO: 6.43 10*3/MM3 (ref 3.4–10.8)

## 2020-09-25 PROCEDURE — 85027 COMPLETE CBC AUTOMATED: CPT

## 2020-09-25 PROCEDURE — 80061 LIPID PANEL: CPT

## 2020-09-25 PROCEDURE — 84443 ASSAY THYROID STIM HORMONE: CPT

## 2020-09-25 PROCEDURE — 80048 BASIC METABOLIC PNL TOTAL CA: CPT

## 2020-09-25 PROCEDURE — 36415 COLL VENOUS BLD VENIPUNCTURE: CPT

## 2020-12-11 ENCOUNTER — HOSPITAL ENCOUNTER (OUTPATIENT)
Dept: MAMMOGRAPHY | Facility: HOSPITAL | Age: 50
Discharge: HOME | End: 2020-12-11
Admitting: OBSTETRICS & GYNECOLOGY

## 2020-12-11 DIAGNOSIS — Z12.31 VISIT FOR SCREENING MAMMOGRAM: ICD-10-CM

## 2020-12-11 PROCEDURE — 77063 BREAST TOMOSYNTHESIS BI: CPT

## 2020-12-11 PROCEDURE — 77067 SCR MAMMO BI INCL CAD: CPT | Performed by: RADIOLOGY

## 2020-12-11 PROCEDURE — 77063 BREAST TOMOSYNTHESIS BI: CPT | Performed by: RADIOLOGY

## 2020-12-11 PROCEDURE — 77067 SCR MAMMO BI INCL CAD: CPT

## 2020-12-31 ENCOUNTER — OFFICE VISIT (OUTPATIENT)
Dept: FAMILY MEDICINE CLINIC | Facility: CLINIC | Age: 50
End: 2020-12-31

## 2020-12-31 VITALS
HEART RATE: 80 BPM | SYSTOLIC BLOOD PRESSURE: 122 MMHG | DIASTOLIC BLOOD PRESSURE: 78 MMHG | OXYGEN SATURATION: 100 % | HEIGHT: 66 IN | WEIGHT: 188 LBS | TEMPERATURE: 97.5 F | BODY MASS INDEX: 30.22 KG/M2

## 2020-12-31 DIAGNOSIS — R35.0 URINARY FREQUENCY: Primary | ICD-10-CM

## 2020-12-31 DIAGNOSIS — N39.0 URINARY TRACT INFECTION WITH HEMATURIA, SITE UNSPECIFIED: ICD-10-CM

## 2020-12-31 DIAGNOSIS — R31.9 URINARY TRACT INFECTION WITH HEMATURIA, SITE UNSPECIFIED: ICD-10-CM

## 2020-12-31 LAB
BILIRUB BLD-MCNC: NEGATIVE MG/DL
CLARITY, POC: ABNORMAL
COLOR UR: YELLOW
GLUCOSE UR STRIP-MCNC: NEGATIVE MG/DL
KETONES UR QL: NEGATIVE
LEUKOCYTE EST, POC: ABNORMAL
NITRITE UR-MCNC: NEGATIVE MG/ML
PH UR: 6 [PH] (ref 5–8)
PROT UR STRIP-MCNC: NEGATIVE MG/DL
RBC # UR STRIP: NEGATIVE /UL
SP GR UR: 1.01 (ref 1–1.03)
UROBILINOGEN UR QL: NORMAL

## 2020-12-31 PROCEDURE — 99213 OFFICE O/P EST LOW 20 MIN: CPT | Performed by: PHYSICIAN ASSISTANT

## 2020-12-31 RX ORDER — CIPROFLOXACIN 500 MG/1
500 TABLET, FILM COATED ORAL 2 TIMES DAILY
Qty: 14 TABLET | Refills: 0 | Status: SHIPPED | OUTPATIENT
Start: 2020-12-31 | End: 2021-01-29

## 2020-12-31 NOTE — PROGRESS NOTES
Subjective   Violet Barrera is a 50 y.o. female  Urinary Frequency (urinary urgency with burning sensation x1 week )      History of Present Illness  Patient is a pleasant 50-year-old white female comes in today for evaluation treatment of low back pressure, urinary frequency urgency for 1 week.  No nausea or vomiting no fever no blood in urine no pain with urination.  She has been increasing her water intake but symptoms are now gone by  The following portions of the patient's history were reviewed and updated as appropriate: allergies, current medications, past social history and problem list    Review of Systems   Constitutional: Negative.  Negative for chills and fever.   Gastrointestinal: Negative for abdominal pain, nausea and vomiting.   Genitourinary: Positive for frequency and urgency. Negative for difficulty urinating, dysuria and hematuria.   Musculoskeletal: Positive for back pain.   Neurological: Negative.        Objective     Vitals:    12/31/20 1000   BP: 122/78   Pulse: 80   Temp: 97.5 °F (36.4 °C)   SpO2: 100%       Physical Exam  Vitals signs and nursing note reviewed.   Constitutional:       General: She is not in acute distress.     Appearance: Normal appearance. She is well-developed. She is not ill-appearing, toxic-appearing or diaphoretic.   Abdominal:      General: There is no distension.      Palpations: Abdomen is soft.      Tenderness: There is no abdominal tenderness. There is no guarding or rebound.   Musculoskeletal:         General: No tenderness.   Skin:     General: Skin is warm and dry.      Coloration: Skin is not jaundiced or pale.      Findings: No erythema or rash.   Neurological:      Mental Status: She is alert.   Psychiatric:         Mood and Affect: Mood normal.         Behavior: Behavior normal.         Thought Content: Thought content normal.         Judgment: Judgment normal.       Reviewed results of urine with patient consistent with UTI  Assessment/Plan     Diagnoses  and all orders for this visit:    1. Urinary frequency (Primary)  -     POC Urinalysis Dipstick, Automated    2. Urinary tract infection with hematuria, site unspecified    Other orders  -     ciprofloxacin (Cipro) 500 MG tablet; Take 1 tablet by mouth 2 (Two) Times a Day.  Dispense: 14 tablet; Refill: 0

## 2021-01-29 ENCOUNTER — OFFICE VISIT (OUTPATIENT)
Dept: FAMILY MEDICINE CLINIC | Facility: CLINIC | Age: 51
End: 2021-01-29

## 2021-01-29 VITALS
DIASTOLIC BLOOD PRESSURE: 80 MMHG | HEIGHT: 66 IN | WEIGHT: 186 LBS | TEMPERATURE: 97.3 F | BODY MASS INDEX: 29.89 KG/M2 | SYSTOLIC BLOOD PRESSURE: 132 MMHG | HEART RATE: 86 BPM | OXYGEN SATURATION: 99 %

## 2021-01-29 DIAGNOSIS — F32.A ANXIETY AND DEPRESSION: ICD-10-CM

## 2021-01-29 DIAGNOSIS — J30.89 NON-SEASONAL ALLERGIC RHINITIS DUE TO OTHER ALLERGIC TRIGGER: ICD-10-CM

## 2021-01-29 DIAGNOSIS — J45.20 MILD INTERMITTENT ASTHMA WITHOUT COMPLICATION: ICD-10-CM

## 2021-01-29 DIAGNOSIS — F41.9 ANXIETY AND DEPRESSION: ICD-10-CM

## 2021-01-29 DIAGNOSIS — I10 ESSENTIAL HYPERTENSION: ICD-10-CM

## 2021-01-29 DIAGNOSIS — R35.0 FREQUENT URINATION: Primary | ICD-10-CM

## 2021-01-29 LAB
BILIRUB BLD-MCNC: NEGATIVE MG/DL
CLARITY, POC: CLEAR
COLOR UR: YELLOW
GLUCOSE UR STRIP-MCNC: NEGATIVE MG/DL
KETONES UR QL: NEGATIVE
LEUKOCYTE EST, POC: NEGATIVE
NITRITE UR-MCNC: NEGATIVE MG/ML
PH UR: 5.5 [PH] (ref 5–8)
PROT UR STRIP-MCNC: NEGATIVE MG/DL
RBC # UR STRIP: NEGATIVE /UL
SP GR UR: 1.02 (ref 1–1.03)
UROBILINOGEN UR QL: NORMAL

## 2021-01-29 PROCEDURE — 99214 OFFICE O/P EST MOD 30 MIN: CPT | Performed by: PHYSICIAN ASSISTANT

## 2021-01-29 RX ORDER — ALBUTEROL SULFATE 90 UG/1
1 AEROSOL, METERED RESPIRATORY (INHALATION) EVERY 4 HOURS PRN
Qty: 18 G | Refills: 11 | Status: SHIPPED | OUTPATIENT
Start: 2021-01-29 | End: 2021-04-27

## 2021-01-29 RX ORDER — FEXOFENADINE HCL 180 MG/1
180 TABLET ORAL DAILY
Qty: 90 TABLET | Refills: 3 | Status: SHIPPED | OUTPATIENT
Start: 2021-01-29 | End: 2021-04-27

## 2021-01-29 RX ORDER — CIPROFLOXACIN 500 MG/1
500 TABLET, FILM COATED ORAL 2 TIMES DAILY
Qty: 14 TABLET | Refills: 0 | Status: SHIPPED | OUTPATIENT
Start: 2021-01-29 | End: 2021-04-09

## 2021-01-29 RX ORDER — BUPROPION HYDROCHLORIDE 300 MG/1
300 TABLET ORAL EVERY MORNING
Qty: 90 TABLET | Refills: 3 | Status: SHIPPED | OUTPATIENT
Start: 2021-01-29 | End: 2022-01-31 | Stop reason: SDUPTHER

## 2021-01-29 RX ORDER — PROPRANOLOL HYDROCHLORIDE 10 MG/1
TABLET ORAL
Qty: 90 TABLET | Refills: 3 | Status: SHIPPED | OUTPATIENT
Start: 2021-01-29 | End: 2021-04-27

## 2021-01-29 RX ORDER — LOSARTAN POTASSIUM 50 MG/1
50 TABLET ORAL DAILY
Qty: 90 TABLET | Refills: 3 | Status: SHIPPED | OUTPATIENT
Start: 2021-01-29 | End: 2021-04-27

## 2021-01-29 RX ORDER — HYDROCHLOROTHIAZIDE 25 MG/1
50 TABLET ORAL DAILY
Qty: 180 TABLET | Refills: 1 | Status: SHIPPED | OUTPATIENT
Start: 2021-01-29 | End: 2021-04-27

## 2021-01-29 NOTE — PROGRESS NOTES
Subjective   Violet Barrera is a 50 y.o. female  Urinary Frequency (urinary frequency with low back pain x1 week ) and Med Refill (req 90 day supply on albuterol, allegra, wellbutrin, hctz, losartan, propanlol to [facility])      History of Present Illness  Patient is a pleasant 50-year-old white female comes in today states she feels the same as when she had a urinary tract infection last month except earlier in her presentation.  She states is having some low back pressure urinary urgency urinary frequency discomfort with urination for the past week.  No blood in urine.  Abdominal pain.  Symptoms had resolved after completing antibiotic first week of this month.  Drinking plenty of water.  She also needs 90 prescription sent to her mail order pharmacy for chronic medical conditions including hypertension, depression, allergies and asthma.  These medical conditions are currently stable on medication.  The following portions of the patient's history were reviewed and updated as appropriate: allergies, current medications, past social history and problem list    Review of Systems   Constitutional: Negative for fatigue and unexpected weight change.   Respiratory: Negative.  Negative for cough, chest tightness and shortness of breath.    Cardiovascular: Negative.  Negative for chest pain, palpitations and leg swelling.   Gastrointestinal: Negative for nausea.   Genitourinary: Positive for dysuria, frequency and urgency.   Musculoskeletal: Positive for back pain.   Skin: Negative for color change and rash.   Neurological: Negative for dizziness, syncope, weakness and headaches.   Psychiatric/Behavioral: Negative.        Objective     Vitals:    01/29/21 0924   BP: 132/80   Pulse: 86   Temp: 97.3 °F (36.3 °C)   SpO2: 99%       Physical Exam  Vitals signs and nursing note reviewed.   Constitutional:       General: She is not in acute distress.     Appearance: Normal appearance. She is well-developed. She is not  ill-appearing, toxic-appearing or diaphoretic.   HENT:      Head: Normocephalic and atraumatic.   Neck:      Vascular: No JVD.   Cardiovascular:      Rate and Rhythm: Normal rate and regular rhythm.      Heart sounds: Normal heart sounds. No murmur.   Pulmonary:      Effort: Pulmonary effort is normal. No respiratory distress.      Breath sounds: Normal breath sounds.   Chest:      Chest wall: No tenderness.   Abdominal:      General: There is no distension.      Palpations: Abdomen is soft.      Tenderness: There is no abdominal tenderness. There is no guarding or rebound.   Skin:     General: Skin is warm and dry.      Coloration: Skin is not pale.      Findings: No erythema.   Neurological:      Mental Status: She is alert and oriented to person, place, and time.   Psychiatric:         Mood and Affect: Mood normal.         Behavior: Behavior normal.         Thought Content: Thought content normal.         Judgment: Judgment normal.     Discussed urinalysis results with patient negative in office.    Assessment/Plan     Diagnoses and all orders for this visit:    1. Frequent urination (Primary)  -     POC Urinalysis Dipstick, Automated    2. Essential hypertension  -     hydroCHLOROthiazide (HYDRODIURIL) 25 MG tablet; Take 2 tablets by mouth Daily.  Dispense: 180 tablet; Refill: 1  -     losartan (COZAAR) 50 MG tablet; Take 1 tablet by mouth Daily.  Dispense: 90 tablet; Refill: 3    3. Anxiety and depression    4. Non-seasonal allergic rhinitis due to other allergic trigger    5. Mild intermittent asthma without complication    Other orders  -     fexofenadine (ALLEGRA) 180 MG tablet; Take 1 tablet by mouth Daily.  Dispense: 90 tablet; Refill: 3  -     albuterol sulfate  (90 Base) MCG/ACT inhaler; Inhale 1 puff Every 4 (Four) Hours As Needed for Wheezing or Shortness of Air.  Dispense: 18 g; Refill: 11  -     buPROPion XL (WELLBUTRIN XL) 300 MG 24 hr tablet; Take 1 tablet by mouth Every Morning.  Dispense:  90 tablet; Refill: 3  -     propranolol (INDERAL) 10 MG tablet; Take one if needed for rapid heart rate  Dispense: 90 tablet; Refill: 3  -     ciprofloxacin (Cipro) 500 MG tablet; Take 1 tablet by mouth 2 (Two) Times a Day.  Dispense: 14 tablet; Refill: 0       Answers for HPI/ROS submitted by the patient on 1/25/2021   Back pain  What is the primary reason for your visit?: Back Pain  Current symptoms consistent with recurrent UTI will treat with Cipro and if symptoms return we will get urine culture.

## 2021-03-16 RX ORDER — ALPRAZOLAM 0.5 MG/1
TABLET ORAL
Qty: 180 TABLET | Refills: 0 | OUTPATIENT
Start: 2021-03-16

## 2021-03-29 DIAGNOSIS — M54.10 RADICULOPATHY OF LEG: ICD-10-CM

## 2021-03-29 DIAGNOSIS — M54.42 CHRONIC BILATERAL LOW BACK PAIN WITH LEFT-SIDED SCIATICA: ICD-10-CM

## 2021-03-29 DIAGNOSIS — G89.29 CHRONIC BILATERAL LOW BACK PAIN WITH LEFT-SIDED SCIATICA: ICD-10-CM

## 2021-03-29 RX ORDER — GABAPENTIN 600 MG/1
TABLET ORAL
Qty: 360 TABLET | Refills: 3 | OUTPATIENT
Start: 2021-03-29

## 2021-03-29 RX ORDER — ALPRAZOLAM 0.5 MG/1
0.5 TABLET ORAL DAILY PRN
OUTPATIENT
Start: 2021-03-29

## 2021-03-29 NOTE — TELEPHONE ENCOUNTER
Caller: Violet Barrera    Relationship: Self    Best call back number:[phone]    Medication needed:   Requested Prescriptions     Pending Prescriptions Disp Refills   • ALPRAZolam (XANAX) 0.5 MG tablet       Si tablet Daily As Needed (1-2 po prn).   • gabapentin (NEURONTIN) 600 MG tablet 360 tablet 3     Sig: Take one every 6 hours for leg pain       When do you need the refill by: ASAP  What additional details did the patient provide when requesting the medication: PATIENT HAS ABOUT 5 OR 6 DAYS  Does the patient have less than a 3 day supply:  [] Yes  [x] No    What is the patient's preferred pharmacy: [facility] [address] [facility] AT [address] - [phone]  - [phone] FX

## 2021-04-09 ENCOUNTER — OFFICE VISIT (OUTPATIENT)
Dept: FAMILY MEDICINE CLINIC | Facility: CLINIC | Age: 51
End: 2021-04-09

## 2021-04-09 VITALS
BODY MASS INDEX: 30.22 KG/M2 | TEMPERATURE: 97 F | OXYGEN SATURATION: 100 % | WEIGHT: 188 LBS | SYSTOLIC BLOOD PRESSURE: 122 MMHG | DIASTOLIC BLOOD PRESSURE: 60 MMHG | HEART RATE: 88 BPM | HEIGHT: 66 IN

## 2021-04-09 DIAGNOSIS — M51.16 LUMBAR DISC DISEASE WITH RADICULOPATHY: ICD-10-CM

## 2021-04-09 DIAGNOSIS — F41.9 ANXIETY AND DEPRESSION: ICD-10-CM

## 2021-04-09 DIAGNOSIS — M51.36 DDD (DEGENERATIVE DISC DISEASE), LUMBAR: Primary | ICD-10-CM

## 2021-04-09 DIAGNOSIS — F32.A ANXIETY AND DEPRESSION: ICD-10-CM

## 2021-04-09 DIAGNOSIS — M79.605 LEG PAIN, LEFT: ICD-10-CM

## 2021-04-09 DIAGNOSIS — J45.20 MILD INTERMITTENT ASTHMA WITHOUT COMPLICATION: ICD-10-CM

## 2021-04-09 PROCEDURE — 99214 OFFICE O/P EST MOD 30 MIN: CPT | Performed by: PHYSICIAN ASSISTANT

## 2021-04-09 RX ORDER — METHOCARBAMOL 500 MG/1
500 TABLET, FILM COATED ORAL 4 TIMES DAILY
Qty: 120 TABLET | Refills: 1 | Status: SHIPPED | OUTPATIENT
Start: 2021-04-09 | End: 2021-04-27

## 2021-04-09 RX ORDER — MONTELUKAST SODIUM 10 MG/1
10 TABLET ORAL NIGHTLY
Qty: 30 TABLET | Refills: 5 | Status: SHIPPED | OUTPATIENT
Start: 2021-04-09 | End: 2021-07-29 | Stop reason: SDUPTHER

## 2021-04-09 RX ORDER — ESTRADIOL 0.1 MG/D
FILM, EXTENDED RELEASE TRANSDERMAL
COMMUNITY
Start: 2021-03-16

## 2021-04-09 RX ORDER — MELOXICAM 15 MG/1
15 TABLET ORAL DAILY
Qty: 30 TABLET | Refills: 0 | Status: SHIPPED | OUTPATIENT
Start: 2021-04-09 | End: 2021-04-27

## 2021-04-09 NOTE — PROGRESS NOTES
Subjective   Violet Barrera is a 50 y.o. female  Back Pain (ongoing lower back pain, req refill on gabapentin) and Anxiety (follow up on anxiety, req refill on alprazolam)      History of Present Illness  Patient is a pleasant 50-year-old white female comes in today for follow-up on anxiety depression as well as for further evaluation of uncontrolled low back pain radiating into left leg with known previous diagnosis of degenerative disc disease and lumbar disc disease with radiculopathy.  Symptoms originally started in 2017 last MRI was at that time see results in chart which I reviewed again today.  Physical therapy was nonbeneficial her at that time, she has not tried physical therapy formally this time has been tried some stretching exercises without relief.  In the past 17 resolution of her symptoms was complex with spinal injections.  She is taking gabapentin which is helping to reduce some of the intensity of pain in her leg and she denies any adverse reactions to this medication needs a refill.  The following portions of the patient's history were reviewed and updated as appropriate: allergies, current medications, past social history and problem list    Review of Systems   Constitutional: Negative.  Negative for appetite change and fatigue.   HENT: Negative.    Eyes: Negative.    Respiratory: Positive for cough, shortness of breath and wheezing. Negative for chest tightness.         Intermittent cough wheezing and shortness of breath, less than once a week, resolves with albuterol 3-year-old worsens anxiety.  Symptoms have been occurring for the past several weeks.  Cough is dry.   Cardiovascular: Negative.    Gastrointestinal: Negative.  Negative for abdominal pain, diarrhea and nausea.   Genitourinary: Negative.    Musculoskeletal: Positive for back pain ( Low back pain radiating into left buttock and left posterior leg to knee). Negative for arthralgias, gait problem and myalgias.    Allergic/Immunologic: Positive for environmental allergies.   Neurological: Negative for dizziness, tremors, weakness, light-headedness, numbness and headaches.   Psychiatric/Behavioral: Negative for agitation, behavioral problems, confusion, decreased concentration, dysphoric mood ( Depression stable on medication), hallucinations, self-injury, sleep disturbance and suicidal ideas. The patient is nervous/anxious. The patient is not hyperactive.        Objective     Vitals:    04/09/21 0925   BP: 122/60   Pulse: 88   Temp: 97 °F (36.1 °C)   SpO2: 100%       Physical Exam  Vitals and nursing note reviewed.   Constitutional:       General: She is not in acute distress.     Appearance: Normal appearance. She is well-developed. She is obese. She is not ill-appearing, toxic-appearing or diaphoretic.   HENT:      Head: Normocephalic and atraumatic.      Right Ear: External ear normal.      Left Ear: External ear normal.      Nose: Nose normal.   Eyes:      Conjunctiva/sclera: Conjunctivae normal.   Cardiovascular:      Rate and Rhythm: Normal rate and regular rhythm.      Heart sounds: Normal heart sounds.   Pulmonary:      Effort: Pulmonary effort is normal. No respiratory distress.      Breath sounds: Normal breath sounds. No wheezing.   Abdominal:      General: Bowel sounds are normal.      Palpations: Abdomen is soft.      Tenderness: There is no abdominal tenderness.   Musculoskeletal:         General: Tenderness ( Lumbar region) present.      Lumbar back: Tenderness and bony tenderness present. No swelling, deformity or spasms. Decreased range of motion.   Skin:     General: Skin is warm and dry.      Coloration: Skin is not pale.      Findings: No erythema or rash.   Neurological:      Mental Status: She is alert and oriented to person, place, and time.      Cranial Nerves: No cranial nerve deficit.      Sensory: No sensory deficit.      Motor: No weakness.      Coordination: Coordination normal.      Gait: Gait  normal.      Deep Tendon Reflexes: Reflexes abnormal ( Trace to 1+ low patella, absent left ankle, 1-2+ right ankle and patella).   Psychiatric:         Attention and Perception: She is attentive.         Speech: Speech normal.         Behavior: Behavior normal.         Thought Content: Thought content normal.         Judgment: Judgment normal.         Assessment/Plan     Diagnoses and all orders for this visit:    1. DDD (degenerative disc disease), lumbar (Primary)  -     MRI Lumbar Spine Without Contrast; Future    2. Lumbar disc disease with radiculopathy  -     MRI Lumbar Spine Without Contrast; Future    3. Leg pain, left  -     MRI Lumbar Spine Without Contrast; Future    4. Mild intermittent asthma without complication    5. Anxiety and depression    Other orders  -     montelukast (Singulair) 10 MG tablet; Take 1 tablet by mouth Every Night. For allergies and asthma  Dispense: 30 tablet; Refill: 5  -     meloxicam (Mobic) 15 MG tablet; Take 1 tablet by mouth Daily. For arthritis in spine  Dispense: 30 tablet; Refill: 0  -     methocarbamol (Robaxin) 500 MG tablet; Take 1 tablet by mouth 4 (Four) Times a Day. For muscle spasm in back  Dispense: 120 tablet; Refill: 1    Refill given for 6-month supply on Xanax 0.5 mg twice daily #180 with 1 refill, gabapentin current dose of 60 mg 4 times daily 360 with 1 refill.  Discussed abuse potential controlled substance status of these medications need to follow-up in 6 months for refills.  Will contact patient MRI results and I receive them and consider referral to pain management for injections if symptoms persist.  If wheezing cough shortness of breath persists after 1 month on Singulair will refer to pulmonary for further evaluation treatment, consider PFTs and chest x-ray as well.

## 2021-04-21 ENCOUNTER — APPOINTMENT (OUTPATIENT)
Dept: MRI IMAGING | Facility: HOSPITAL | Age: 51
End: 2021-04-21

## 2021-04-21 ENCOUNTER — TELEPHONE (OUTPATIENT)
Dept: FAMILY MEDICINE CLINIC | Facility: CLINIC | Age: 51
End: 2021-04-21

## 2021-04-21 NOTE — TELEPHONE ENCOUNTER
Caller: JONG     Relationship to patient:     Best call back number: [phone]    New or established patient?  [] New  [x] Established    Date of discharge: 4-20-21  Facility discharged from: [facility]     Diagnosis/Symptoms: CHEST PAINS, DID HEART CATHETERIZATION     Length of stay (If applicable): 2 NIGHTS     Specialty Only: Did you see a [facility] health provider?    [] Yes  [x] No  If so, who?

## 2021-04-22 ENCOUNTER — READMISSION MANAGEMENT (OUTPATIENT)
Dept: CALL CENTER | Facility: HOSPITAL | Age: 51
End: 2021-04-22

## 2021-04-22 ENCOUNTER — TRANSITIONAL CARE MANAGEMENT TELEPHONE ENCOUNTER (OUTPATIENT)
Dept: CALL CENTER | Facility: HOSPITAL | Age: 51
End: 2021-04-22

## 2021-04-22 NOTE — OUTREACH NOTE
Call Center TCM Note      Responses   [facility] patient discharged from?  Non-BH   Does the patient have one of the following disease processes/diagnoses(primary or secondary)?  Other   TCM attempt successful?  No   Unsuccessful attempts  Attempt 2          Ciara Mac MA    4/22/2021, 16:22 EDT

## 2021-04-22 NOTE — OUTREACH NOTE
Prep Survey      Responses   [facility]?  [facility]   Is LACE score < 7 ?  Non-BH Discharge   Emergency Room discharge w/ pulse ox?  No   Eligibility  [facility]   Date of Discharge  04/20/21   Discharge diagnosis  chest pain, s/p heart cath   Does the patient have one of the following disease processes/diagnoses(primary or secondary)?  Other   Prep survey completed?  Yes          Cristina House RN

## 2021-04-22 NOTE — OUTREACH NOTE
Call Center TCM Note      Responses   [facility] patient discharged from?  Non-BH   Does the patient have one of the following disease processes/diagnoses(primary or secondary)?  Other   TCM attempt successful?  No   Unsuccessful attempts  Attempt 1          Ciara Mac MA    4/22/2021, 15:03 EDT

## 2021-04-23 ENCOUNTER — TRANSITIONAL CARE MANAGEMENT TELEPHONE ENCOUNTER (OUTPATIENT)
Dept: CALL CENTER | Facility: HOSPITAL | Age: 51
End: 2021-04-23

## 2021-04-23 ENCOUNTER — TELEPHONE (OUTPATIENT)
Dept: FAMILY MEDICINE CLINIC | Facility: CLINIC | Age: 51
End: 2021-04-23

## 2021-04-23 NOTE — OUTREACH NOTE
Call Center TCM Note      Responses   [facility] patient discharged from?  Non-   Does the patient have one of the following disease processes/diagnoses(primary or secondary)?  Other   TCM attempt successful?  Yes   Call start time  [phone]   Call end time  0958   Discharge diagnosis  chest pain, s/p heart cath   Is patient permission given to speak with other caregiver?  Yes   List who call center can speak with  daughter   Person spoke with today (if not patient) and relationship  daughter   Does the patient have all medications ordered at discharge?  Yes   Is the patient taking all medications as directed (includes completed medication regime)?  Yes   Does the patient have a primary care provider?   Yes   Does the patient have an appointment with their PCP within 7 days of discharge?  Yes   Comments regarding PCP  f/u with Kianna Gandhi PA-C on 4/27 at 11 am   Has the patient kept scheduled appointments due by today?  N/A   Psychosocial issues?  No   Did the patient receive a copy of their discharge instructions?  Yes   What is the patient's perception of their health status since discharge?  Improving   Is the patient/caregiver able to teach back the hierarchy of who to call/visit for symptoms/problems? PCP, Specialist, Home health nurse, Urgent Care, ED, 911  Yes   TCM call completed?  Yes   Wrap up additional comments  Per daughter, patient is doing well, confirmed f/u with Kianna Gandhi PA-C for 4/27, no questions or concerns at this time.          Nova Hilliard RN    4/23/2021, 09:58 EDT

## 2021-04-27 ENCOUNTER — OFFICE VISIT (OUTPATIENT)
Dept: FAMILY MEDICINE CLINIC | Facility: CLINIC | Age: 51
End: 2021-04-27

## 2021-04-27 VITALS
WEIGHT: 183 LBS | OXYGEN SATURATION: 98 % | DIASTOLIC BLOOD PRESSURE: 68 MMHG | HEART RATE: 79 BPM | HEIGHT: 66 IN | SYSTOLIC BLOOD PRESSURE: 104 MMHG | BODY MASS INDEX: 29.41 KG/M2 | TEMPERATURE: 96.8 F

## 2021-04-27 DIAGNOSIS — F41.8 SITUATIONAL ANXIETY: Primary | ICD-10-CM

## 2021-04-27 DIAGNOSIS — I42.9 CARDIOMYOPATHY, UNSPECIFIED TYPE (HCC): ICD-10-CM

## 2021-04-27 PROCEDURE — 99213 OFFICE O/P EST LOW 20 MIN: CPT | Performed by: PHYSICIAN ASSISTANT

## 2021-04-27 RX ORDER — SACUBITRIL AND VALSARTAN 24; 26 MG/1; MG/1
TABLET, FILM COATED ORAL EVERY 12 HOURS SCHEDULED
COMMUNITY
Start: 2021-04-20 | End: 2021-11-24 | Stop reason: ALTCHOICE

## 2021-04-27 RX ORDER — BUMETANIDE 2 MG/1
TABLET ORAL
COMMUNITY
Start: 2021-04-20 | End: 2021-11-24

## 2021-04-27 RX ORDER — SPIRONOLACTONE 25 MG/1
TABLET ORAL
COMMUNITY
Start: 2021-04-20

## 2021-04-27 RX ORDER — CARVEDILOL 3.12 MG/1
TABLET ORAL EVERY 12 HOURS SCHEDULED
COMMUNITY
Start: 2021-04-20 | End: 2021-11-24 | Stop reason: ALTCHOICE

## 2021-04-27 NOTE — PROGRESS NOTES
Subjective   Violet Barrera is a 50 y.o. female  Cardiomyopathy (Follow up from [facility] for Cardiomyopathy, seen by Dr. Flores yesterday)      History of Present Illness  Patient is a pleasant 50-year-old white female well-known to me who presents today for situational anxiety which is currently uncontrolled further evaluation and treatment.  She is newly diagnosed nonischemic cardiomyopathy with a low ejection fraction and mild CHF.  She was admitted to [facility] recently for this diagnosis and was seen yesterday by her cardiologist, Dr. Flores for further evaluation and treatment.  Dr. Flores will be closely monitoring and treating her cardiomyopathy and has referred her to a further cardiologist/specialist at  on the transplant surgery team for consultation.  Patient is currently struggling with severe situational anxiety and panic related to this new diagnosis and her current health crisis.  She is currently stable from a cardiovascular standpoint oxygen levels are normal off oxygen supplementation, blood pressure stable patient states that her mind is racing and she has panic worry and anxiety constantly throughout the day and night.  She is currently on Wellbutrin each morning and was previously stable on his medication has been for a number of years until new diagnosis.  Patient currently has a prescription of alprazolam 0.5 mg that she took daily as needed prior to her current situation, this medicine has worked well for her in the past for situational anxiety.  Patient does not have any suicidal homicidal ideations at this time.  The following portions of the patient's history were reviewed and updated as appropriate: allergies, current medications, past social history and problem list    Review of Systems   Constitutional: Negative for appetite change and fatigue.   Respiratory: Negative.  Negative for chest tightness and shortness of breath.    Cardiovascular: Negative.    Gastrointestinal:  Negative for abdominal pain, diarrhea and nausea.   Neurological: Negative for dizziness, tremors, weakness, light-headedness and headaches.   Psychiatric/Behavioral: Positive for dysphoric mood and sleep disturbance. Negative for agitation, behavioral problems, confusion, decreased concentration, hallucinations, self-injury and suicidal ideas. The patient is nervous/anxious. The patient is not hyperactive.        Objective     Vitals:    04/27/21 1120   BP: 104/68   Pulse: 79   Temp: 96.8 °F (36 °C)   SpO2: 98%       Physical Exam  Vitals and nursing note reviewed.   Constitutional:       General: She is not in acute distress.     Appearance: Normal appearance. She is well-developed. She is not ill-appearing, toxic-appearing or diaphoretic.   HENT:      Head: Normocephalic and atraumatic.   Eyes:      Conjunctiva/sclera: Conjunctivae normal.   Neck:      Thyroid: No thyroid mass or thyromegaly.   Cardiovascular:      Rate and Rhythm: Normal rate and regular rhythm.      Heart sounds: Normal heart sounds.   Pulmonary:      Effort: Pulmonary effort is normal. No respiratory distress.   Skin:     General: Skin is warm and dry.      Coloration: Skin is not jaundiced or pale.      Findings: No erythema or rash.   Neurological:      Mental Status: She is alert and oriented to person, place, and time.      Coordination: Coordination normal.   Psychiatric:         Attention and Perception: She is attentive.         Mood and Affect: Mood is anxious. Mood is not depressed. Affect is not angry or inappropriate.         Speech: Speech normal.         Behavior: Behavior normal.         Thought Content: Thought content normal.         Judgment: Judgment normal.         Assessment/Plan     Diagnoses and all orders for this visit:    1. Situational anxiety (Primary)    2. Cardiomyopathy, unspecified type (CMS/HCC)    Increase dosage of alprazolam 0.5 mg 3 times daily prescription written for quantity 90 for 1 month prescription  with 1 refill.  Discussed abuse potential and controlled substance as of this medication.  Continue on Wellbutrin at current dosage, follow-up in 4 weeks for recheck.  If no improvement in control of anxiety symptoms within 1 week on medication change follow-up for further evaluation and treatment.  Consider adding an SSRI as well.  Continue following up with cardiology, has appoint follow-up in 4 weeks.  Discussed cognitive behavioral therapy stress management techniques as well.

## 2021-05-03 RX ORDER — MONTELUKAST SODIUM 10 MG/1
10 TABLET ORAL NIGHTLY
Qty: 30 TABLET | Refills: 5 | OUTPATIENT
Start: 2021-05-03

## 2021-05-10 ENCOUNTER — TELEPHONE (OUTPATIENT)
Dept: FAMILY MEDICINE CLINIC | Facility: CLINIC | Age: 51
End: 2021-05-10

## 2021-06-29 RX ORDER — ALPRAZOLAM 0.5 MG/1
TABLET ORAL
Qty: 90 TABLET | Refills: 0 | OUTPATIENT
Start: 2021-06-29

## 2021-07-29 ENCOUNTER — TELEMEDICINE (OUTPATIENT)
Dept: FAMILY MEDICINE CLINIC | Facility: CLINIC | Age: 51
End: 2021-07-29

## 2021-07-29 DIAGNOSIS — F41.8 SITUATIONAL ANXIETY: ICD-10-CM

## 2021-07-29 DIAGNOSIS — J45.20 MILD INTERMITTENT ASTHMA WITHOUT COMPLICATION: Primary | ICD-10-CM

## 2021-07-29 DIAGNOSIS — M54.10 RADICULOPATHY OF LEG: ICD-10-CM

## 2021-07-29 DIAGNOSIS — M54.42 CHRONIC BILATERAL LOW BACK PAIN WITH LEFT-SIDED SCIATICA: ICD-10-CM

## 2021-07-29 DIAGNOSIS — F32.A ANXIETY AND DEPRESSION: ICD-10-CM

## 2021-07-29 DIAGNOSIS — F41.9 ANXIETY AND DEPRESSION: ICD-10-CM

## 2021-07-29 DIAGNOSIS — G89.29 CHRONIC BILATERAL LOW BACK PAIN WITH LEFT-SIDED SCIATICA: ICD-10-CM

## 2021-07-29 DIAGNOSIS — F41.8 SITUATIONAL ANXIETY: Primary | ICD-10-CM

## 2021-07-29 PROCEDURE — 99213 OFFICE O/P EST LOW 20 MIN: CPT | Performed by: PHYSICIAN ASSISTANT

## 2021-07-29 RX ORDER — MONTELUKAST SODIUM 10 MG/1
10 TABLET ORAL NIGHTLY
Qty: 30 TABLET | Refills: 11 | Status: SHIPPED | OUTPATIENT
Start: 2021-07-29 | End: 2021-07-29 | Stop reason: SDUPTHER

## 2021-07-29 RX ORDER — MONTELUKAST SODIUM 10 MG/1
10 TABLET ORAL NIGHTLY
Qty: 30 TABLET | Refills: 11 | Status: SHIPPED | OUTPATIENT
Start: 2021-07-29 | End: 2022-02-07 | Stop reason: SDUPTHER

## 2021-07-29 RX ORDER — GABAPENTIN 600 MG/1
600 TABLET ORAL 4 TIMES DAILY
Qty: 120 TABLET | Refills: 5 | Status: SHIPPED | OUTPATIENT
Start: 2021-07-29 | End: 2021-11-24 | Stop reason: SDUPTHER

## 2021-07-29 RX ORDER — ALPRAZOLAM 0.5 MG/1
0.5 TABLET ORAL 3 TIMES DAILY PRN
Qty: 90 TABLET | Refills: 5 | Status: SHIPPED | OUTPATIENT
Start: 2021-07-29 | End: 2022-06-10 | Stop reason: SDUPTHER

## 2021-07-29 NOTE — PROGRESS NOTES
Subjective   Violet Barrera is a 51 y.o. female  Med Refill and Anxiety      History of Present Illness  Patient is a 51-year-old female.Patient presents and consents for telehealth/video visit examination.  Patient is following up on chronic lumbar discogenic pain syndrome with radiculopathy currently stable on gabapentin and situational anxiety in addition to generalized anxiety disorder currently stable on combination of bupropion and Xanax due for refills of Xanax.  Additionally she is due for refills on Singulair for asthma.  Asthma is currently stable.  Generalized anxiety disorder currently stable still having breakthrough symptoms of situational anxiety related to current cardiomyopathy although she feels the Xanax is working well.  Denies any adverse effects from current medications.  She is under the care of the cardiac team at  and just had a pacemaker placed within the last couple of weeks, we have been receiving their notes and they are in her chart.  Video visit today 15 minutes in length.  The following portions of the patient's history were reviewed and updated as appropriate: allergies, current medications, past social history and problem list    Review of Systems   Constitutional: Negative.  Negative for appetite change and fatigue.   Respiratory: Negative.  Negative for chest tightness and shortness of breath.    Cardiovascular: Negative.    Gastrointestinal: Negative.  Negative for abdominal pain, diarrhea and nausea.   Genitourinary: Negative.    Musculoskeletal: Positive for back pain ( Chronic back pain stable). Negative for arthralgias, gait problem and myalgias.   Neurological: Negative for dizziness, tremors, weakness, light-headedness, numbness and headaches.   Psychiatric/Behavioral: Positive for sleep disturbance ( Related to anxiety responds well to medication). Negative for agitation, behavioral problems, confusion, decreased concentration, dysphoric mood and suicidal ideas. The  patient is nervous/anxious ( Anxiety stable on medication).        Objective     There were no vitals filed for this visit.    Physical Exam  Constitutional:       General: She is not in acute distress.     Appearance: Normal appearance. She is well-developed. She is not ill-appearing, toxic-appearing or diaphoretic.   HENT:      Head: Normocephalic and atraumatic.   Eyes:      Conjunctiva/sclera: Conjunctivae normal.   Neck:      Thyroid: No thyroid mass or thyromegaly.   Pulmonary:      Effort: Pulmonary effort is normal. No respiratory distress.   Skin:     Comments: Evidence on left upper chest wall of recent surgical procedure for pacemaker implantation   Neurological:      Mental Status: She is alert and oriented to person, place, and time.   Psychiatric:         Attention and Perception: She is attentive.         Mood and Affect: Mood is anxious. Mood is not depressed. Affect is not angry or inappropriate.         Speech: Speech normal.         Behavior: Behavior normal.         Thought Content: Thought content normal.         Judgment: Judgment normal.         Assessment/Plan     Diagnoses and all orders for this visit:    1. Mild intermittent asthma without complication (Primary)    2. Radiculopathy of leg    3. Situational anxiety    4. Anxiety and depression    Other orders  -     Discontinue: montelukast (Singulair) 10 MG tablet; Take 1 tablet by mouth Every Night. For allergies and asthma  Dispense: 30 tablet; Refill: 11  -     montelukast (Singulair) 10 MG tablet; Take 1 tablet by mouth Every Night. For allergies and asthma  Dispense: 30 tablet; Refill: 11    Prescription will be sent for a 6-month supply of Xanax at current dosage of 0.5 mg 3 times daily as needed #90 with 5 refills and gabapentin current dosage 600 mg 4 times daily #120 with 5 refills.    As part of this patient's treatment plan, patient will be prescribed controlled substances. The patient has been made aware of appropriate use of  such medications, including potential risk of somnolence, limited ability to drive and /or work safely, and potential for dependence or overdose. It has also been made clear that these medications are for use by this patient only, without concomitant use of alcohol or other substances unless prescribed.Controlled substance status of medication discussed with patient, discussed risks of medication including abuse potential and diversion potential and need to follow up for reevaluation appointment in order to receive further refills.    Please note that portions of this document were completed with a voice recognition program. Efforts were made to edit the dictations, but occasionally words are mis-transcribed

## 2021-08-25 ENCOUNTER — TELEMEDICINE (OUTPATIENT)
Dept: FAMILY MEDICINE CLINIC | Facility: CLINIC | Age: 51
End: 2021-08-25

## 2021-08-25 DIAGNOSIS — F41.8 SITUATIONAL ANXIETY: ICD-10-CM

## 2021-08-25 DIAGNOSIS — N39.0 URINARY TRACT INFECTION WITHOUT HEMATURIA, SITE UNSPECIFIED: Primary | ICD-10-CM

## 2021-08-25 PROCEDURE — 99214 OFFICE O/P EST MOD 30 MIN: CPT | Performed by: PHYSICIAN ASSISTANT

## 2021-08-25 RX ORDER — SERTRALINE HYDROCHLORIDE 25 MG/1
25 TABLET, FILM COATED ORAL DAILY
Qty: 30 TABLET | Refills: 5 | Status: SHIPPED | OUTPATIENT
Start: 2021-08-25 | End: 2021-09-08 | Stop reason: SDUPTHER

## 2021-08-25 RX ORDER — SULFAMETHOXAZOLE AND TRIMETHOPRIM 800; 160 MG/1; MG/1
1 TABLET ORAL 2 TIMES DAILY
Qty: 10 TABLET | Refills: 1 | Status: SHIPPED | OUTPATIENT
Start: 2021-08-25 | End: 2021-11-24

## 2021-08-25 NOTE — PROGRESS NOTES
Subjective    Violet Barrera is a 51 y.o. female  Anxiety      History of Present Illness  Patient is a 51-year-old female.Patient presents and consents for telehealth/video visit examination.  Patient presents today for evaluation and treatment of 2 separate uncontrolled medical conditions she states she feels [place] kidney infection that she start having burning with urination yesterday no nausea vomiting or fever no blood in urine.  She is limited on her fluid to take due to her cardiomyopathy but is try to increase her fluid intake over the last 2 days.  She also was to discuss uncontrolled symptoms of situational anxiety in association with her cardiomyopathy.  She is currently taking Xanax and Wellbutrin she discussed this with her cardiologist who suggested that perhaps we add on an SSRI, he did feel that her current medications were okay to continue.  Video visit 15 minutes in length.  The following portions of the patient's history were reviewed and updated as appropriate: allergies, current medications, past social history and problem list    Review of Systems   Constitutional: Negative for appetite change, chills, fatigue and fever.   Respiratory: Negative for chest tightness and shortness of breath.    Gastrointestinal: Negative for abdominal pain, diarrhea, nausea and vomiting.   Genitourinary: Positive for dysuria, frequency and urgency. Negative for difficulty urinating, dyspareunia, enuresis, flank pain, genital sores and hematuria.   Neurological: Negative for dizziness, tremors, weakness, light-headedness and headaches.   Psychiatric/Behavioral: Negative for agitation, behavioral problems, confusion, decreased concentration, dysphoric mood, hallucinations, self-injury, sleep disturbance and suicidal ideas. The patient is nervous/anxious. The patient is not hyperactive.        Objective     There were no vitals filed for this visit.    Physical Exam  Constitutional:       General: She is not in  acute distress.     Appearance: Normal appearance. She is well-developed. She is not ill-appearing, toxic-appearing or diaphoretic.   HENT:      Head: Normocephalic and atraumatic.   Eyes:      Conjunctiva/sclera: Conjunctivae normal.   Pulmonary:      Effort: Pulmonary effort is normal. No respiratory distress.   Skin:     General: Skin is dry.      Coloration: Skin is not pale.      Findings: No erythema or rash.   Neurological:      Mental Status: She is alert and oriented to person, place, and time.      Coordination: Coordination normal.   Psychiatric:         Attention and Perception: She is attentive.         Mood and Affect: Mood normal.         Speech: Speech normal.         Behavior: Behavior normal.         Thought Content: Thought content normal.         Judgment: Judgment normal.         Assessment/Plan     Diagnoses and all orders for this visit:    1. Urinary tract infection without hematuria, site unspecified (Primary)    2. Situational anxiety    Other orders  -     sulfamethoxazole-trimethoprim (Bactrim DS) 800-160 MG per tablet; Take 1 tablet by mouth 2 (Two) Times a Day.  Dispense: 10 tablet; Refill: 1  -     sertraline (Zoloft) 25 MG tablet; Take 1 tablet by mouth Daily. For anxiety  Dispense: 30 tablet; Refill: 5    Continue on current dosage of Wellbutrin and Xanax.  Follow-up if unimproved in 1 week.    Please note that portions of this document were completed with a voice recognition program. Efforts were made to edit the dictations, but occasionally words are mis-transcribed

## 2021-09-08 ENCOUNTER — TELEPHONE (OUTPATIENT)
Dept: FAMILY MEDICINE CLINIC | Facility: CLINIC | Age: 51
End: 2021-09-08

## 2021-09-08 NOTE — TELEPHONE ENCOUNTER
Increase dose to 50 mg daily, take 2 at a time of the current 25 mg pills, new prescription has been sent.  Follow-up in 1 month for recheck     Kianna Gandhi PA-C

## 2021-09-08 NOTE — TELEPHONE ENCOUNTER
Caller: Violet Barrera    Relationship: Self    Best call back number: [phone]    What is the best time to reach you: ANYTIME    Who are you requesting to speak with (clinical staff, provider,  specific staff member): MARIAH BENDER    Do you know the name of the person who called:     What was the call regarding: INCREASE DOSAGE  sertraline (Zoloft) 25 MG tablet      Do you require a callback: YES

## 2021-11-08 ENCOUNTER — TRANSCRIBE ORDERS (OUTPATIENT)
Dept: ADMINISTRATIVE | Facility: HOSPITAL | Age: 51
End: 2021-11-08

## 2021-11-08 DIAGNOSIS — Z12.31 VISIT FOR SCREENING MAMMOGRAM: Primary | ICD-10-CM

## 2021-11-24 ENCOUNTER — HOSPITAL ENCOUNTER (OUTPATIENT)
Dept: GENERAL RADIOLOGY | Facility: HOSPITAL | Age: 51
Discharge: HOME | End: 2021-11-24
Admitting: PHYSICIAN ASSISTANT

## 2021-11-24 ENCOUNTER — OFFICE VISIT (OUTPATIENT)
Dept: FAMILY MEDICINE CLINIC | Facility: CLINIC | Age: 51
End: 2021-11-24

## 2021-11-24 VITALS
HEIGHT: 66 IN | OXYGEN SATURATION: 99 % | HEART RATE: 65 BPM | DIASTOLIC BLOOD PRESSURE: 68 MMHG | SYSTOLIC BLOOD PRESSURE: 116 MMHG | TEMPERATURE: 97.2 F | WEIGHT: 189 LBS | BODY MASS INDEX: 30.37 KG/M2

## 2021-11-24 DIAGNOSIS — M54.2 NECK PAIN, CHRONIC: ICD-10-CM

## 2021-11-24 DIAGNOSIS — M54.12 CERVICAL RADICULOPATHY: ICD-10-CM

## 2021-11-24 DIAGNOSIS — M54.10 RADICULOPATHY OF LEG: ICD-10-CM

## 2021-11-24 DIAGNOSIS — G89.29 NECK PAIN, CHRONIC: ICD-10-CM

## 2021-11-24 DIAGNOSIS — M54.42 CHRONIC BILATERAL LOW BACK PAIN WITH LEFT-SIDED SCIATICA: ICD-10-CM

## 2021-11-24 DIAGNOSIS — M54.12 CERVICAL RADICULOPATHY: Primary | ICD-10-CM

## 2021-11-24 DIAGNOSIS — G89.29 CHRONIC BILATERAL LOW BACK PAIN WITH LEFT-SIDED SCIATICA: ICD-10-CM

## 2021-11-24 PROCEDURE — 72040 X-RAY EXAM NECK SPINE 2-3 VW: CPT

## 2021-11-24 PROCEDURE — 99214 OFFICE O/P EST MOD 30 MIN: CPT | Performed by: PHYSICIAN ASSISTANT

## 2021-11-24 RX ORDER — TIZANIDINE 2 MG/1
2 TABLET ORAL NIGHTLY PRN
Qty: 30 TABLET | Refills: 5 | Status: SHIPPED | OUTPATIENT
Start: 2021-11-24 | End: 2022-02-07 | Stop reason: SDUPTHER

## 2021-11-24 RX ORDER — SACUBITRIL AND VALSARTAN 97; 103 MG/1; MG/1
TABLET, FILM COATED ORAL
COMMUNITY
Start: 2021-11-13

## 2021-11-24 RX ORDER — GABAPENTIN 600 MG/1
600 TABLET ORAL 4 TIMES DAILY
Qty: 120 TABLET | Refills: 5 | Status: SHIPPED | OUTPATIENT
Start: 2021-11-24 | End: 2022-06-10 | Stop reason: SDUPTHER

## 2021-11-24 RX ORDER — CARVEDILOL 12.5 MG/1
12.5 TABLET ORAL
COMMUNITY
Start: 2021-07-09 | End: 2022-07-09

## 2021-11-24 NOTE — PROGRESS NOTES
Subjective   Violet Barrera is a 51 y.o. female  Anxiety (follow up anxiety, refill on alprazolam ) and Neck Pain (increased neck pain x1 month )      History of Present Illness  Patient is a very pleasant 51-year-old female comes in for follow-up on situational anxiety, chronic low back pain with sciatica and radiculopathy also to discuss ongoing chronic posterior neck pain radiating into arms and hands when she sleeps at night.  Patient situational anxiety stress related to her diagnosis of cardiomyopathy earlier this year with cardiac dysrhythmia.  She has received some good news from her cardiologist this week that her heart is improving, she does still experience anxiety/and her pacemaker goes off but she is feeling that she is coping better with stress currently taking Wellbutrin, Zoloft and Xanax as needed.  Back pain with radiculopathy of leg is stable on gabapentin but patient is noticed last couple months with posterior neck pain waking up at night and having numbness and tingling in her arms waking her up at night.  No history of trauma.  The following portions of the patient's history were reviewed and updated as appropriate: allergies, current medications, past social history and problem list    Review of Systems   Constitutional: Negative.    HENT: Negative for sore throat, trouble swallowing and voice change.    Respiratory: Negative.  Negative for shortness of breath.    Gastrointestinal: Negative.    Genitourinary: Negative.    Musculoskeletal: Positive for back pain, neck pain and neck stiffness. Negative for arthralgias, gait problem and myalgias.   Skin: Negative for rash and wound.   Neurological: Positive for numbness. Negative for dizziness, tremors and weakness.   Hematological: Negative for adenopathy.   Psychiatric/Behavioral: Positive for sleep disturbance. Negative for dysphoric mood, self-injury and suicidal ideas. The patient is nervous/anxious.        Objective     Vitals:     11/24/21 0906   BP: 116/68   Pulse: 65   Temp: 97.2 °F (36.2 °C)   SpO2: 99%       Physical Exam  Vitals and nursing note reviewed.   Constitutional:       General: She is not in acute distress.     Appearance: Normal appearance. She is well-developed. She is not ill-appearing, toxic-appearing or diaphoretic.   HENT:      Head: Normocephalic and atraumatic.   Neck:      Thyroid: No thyromegaly.      Vascular: No JVD.      Trachea: No tracheal deviation.   Cardiovascular:      Rate and Rhythm: Normal rate and regular rhythm.      Heart sounds: Normal heart sounds. No murmur heard.      Pulmonary:      Effort: Pulmonary effort is normal. No respiratory distress.      Breath sounds: Normal breath sounds. No stridor.   Chest:      Chest wall: No tenderness.   Abdominal:      General: There is no distension.      Palpations: Abdomen is soft.      Tenderness: There is no abdominal tenderness.   Musculoskeletal:      Cervical back: Tenderness ( Mild tenderness generalized posterior cervical region) present. Spinous process tenderness and muscular tenderness present. Decreased range of motion.   Lymphadenopathy:      Cervical: No cervical adenopathy.   Skin:     General: Skin is warm and dry.      Coloration: Skin is not pale.      Findings: No erythema or rash.   Neurological:      Mental Status: She is alert and oriented to person, place, and time.      Cranial Nerves: No cranial nerve deficit.      Sensory: No sensory deficit.      Motor: No weakness.      Coordination: Coordination normal.      Gait: Gait normal.      Deep Tendon Reflexes: Reflexes normal.   Psychiatric:         Mood and Affect: Mood normal.         Behavior: Behavior normal.         Thought Content: Thought content normal.         Judgment: Judgment normal.         Assessment/Plan     Diagnoses and all orders for this visit:    1. Cervical radiculopathy (Primary)  -     XR Spine Cervical 2 or 3 View; Future    2. Neck pain, chronic  -     XR Spine  Cervical 2 or 3 View; Future    3. Chronic bilateral low back pain with left-sided sciatica  -     gabapentin (NEURONTIN) 600 MG tablet; Take 1 tablet by mouth 4 (Four) Times a Day.  Dispense: 120 tablet; Refill: 5    4. Radiculopathy of leg  -     gabapentin (NEURONTIN) 600 MG tablet; Take 1 tablet by mouth 4 (Four) Times a Day.  Dispense: 120 tablet; Refill: 5    Other orders  -     tiZANidine (ZANAFLEX) 2 MG tablet; Take 1 tablet by mouth At Night As Needed for Muscle Spasms.  Dispense: 30 tablet; Refill: 5  -     sertraline (Zoloft) 50 MG tablet; Take 1 tablet by mouth Daily. New dose, For anxiety  Dispense: 30 tablet; Refill: 11    Refill given for 6 months  Xanax 0.5 mg 3 times daily as needed for anxiety #90 with 5 refills.  Follow-up in 6-month recheck.  I will contact patient with x-ray report I receive it if neck pain with radiculopathy persist will refer to physical therapy.    Part of this note may be an electronic transcription/translation of spoken language to printed text using the Dragon Dictation System.

## 2021-12-18 ENCOUNTER — HOSPITAL ENCOUNTER (OUTPATIENT)
Dept: MAMMOGRAPHY | Facility: HOSPITAL | Age: 51
Discharge: HOME | End: 2021-12-18
Admitting: PHYSICIAN ASSISTANT

## 2021-12-18 DIAGNOSIS — Z12.31 VISIT FOR SCREENING MAMMOGRAM: ICD-10-CM

## 2021-12-18 PROCEDURE — 77063 BREAST TOMOSYNTHESIS BI: CPT

## 2021-12-18 PROCEDURE — 77063 BREAST TOMOSYNTHESIS BI: CPT | Performed by: RADIOLOGY

## 2021-12-18 PROCEDURE — 77067 SCR MAMMO BI INCL CAD: CPT | Performed by: RADIOLOGY

## 2021-12-18 PROCEDURE — 77067 SCR MAMMO BI INCL CAD: CPT

## 2022-01-26 ENCOUNTER — HOSPITAL ENCOUNTER (OUTPATIENT)
Dept: MAMMOGRAPHY | Facility: HOSPITAL | Age: 52
Discharge: HOME | End: 2022-01-26
Admitting: RADIOLOGY

## 2022-01-26 DIAGNOSIS — R92.8 ABNORMAL MAMMOGRAM: ICD-10-CM

## 2022-01-26 PROCEDURE — G0279 TOMOSYNTHESIS, MAMMO: HCPCS

## 2022-01-26 PROCEDURE — 77061 BREAST TOMOSYNTHESIS UNI: CPT | Performed by: RADIOLOGY

## 2022-01-26 PROCEDURE — 77065 DX MAMMO INCL CAD UNI: CPT

## 2022-01-26 PROCEDURE — 77065 DX MAMMO INCL CAD UNI: CPT | Performed by: RADIOLOGY

## 2022-02-01 RX ORDER — BUPROPION HYDROCHLORIDE 300 MG/1
300 TABLET ORAL EVERY MORNING
Qty: 90 TABLET | Refills: 3 | Status: SHIPPED | OUTPATIENT
Start: 2022-02-01 | End: 2022-02-07 | Stop reason: SDUPTHER

## 2022-02-07 RX ORDER — BUPROPION HYDROCHLORIDE 300 MG/1
300 TABLET ORAL EVERY MORNING
Qty: 90 TABLET | Refills: 1 | Status: SHIPPED | OUTPATIENT
Start: 2022-02-07 | End: 2022-06-10 | Stop reason: SDUPTHER

## 2022-02-07 RX ORDER — MONTELUKAST SODIUM 10 MG/1
10 TABLET ORAL NIGHTLY
Qty: 90 TABLET | Refills: 1 | Status: SHIPPED | OUTPATIENT
Start: 2022-02-07 | End: 2022-07-26

## 2022-02-07 RX ORDER — TIZANIDINE 2 MG/1
2 TABLET ORAL NIGHTLY PRN
Qty: 90 TABLET | Refills: 1 | Status: SHIPPED | OUTPATIENT
Start: 2022-02-07 | End: 2022-06-10

## 2022-02-07 NOTE — TELEPHONE ENCOUNTER
----- Message from Violet Barrera sent at 2/4/2022  2:12 PM EST -----  Regarding: [facility]  Could you please send request for [facility]ills [facility] as soon as possible I would greatly appreciate it.  Also could you let me know when this has been completed.    Thank You   Violet Barrera

## 2022-03-01 DIAGNOSIS — F32.A ANXIETY AND DEPRESSION: ICD-10-CM

## 2022-03-01 DIAGNOSIS — F41.8 SITUATIONAL ANXIETY: ICD-10-CM

## 2022-03-01 DIAGNOSIS — F41.9 ANXIETY AND DEPRESSION: ICD-10-CM

## 2022-03-01 RX ORDER — ALPRAZOLAM 0.5 MG/1
0.5 TABLET ORAL 3 TIMES DAILY PRN
Qty: 90 TABLET | Refills: 5 | OUTPATIENT
Start: 2022-03-01

## 2022-06-09 ENCOUNTER — TRANSCRIBE ORDERS (OUTPATIENT)
Dept: LAB | Facility: HOSPITAL | Age: 52
End: 2022-06-09

## 2022-06-09 ENCOUNTER — LAB (OUTPATIENT)
Dept: LAB | Facility: HOSPITAL | Age: 52
End: 2022-06-09

## 2022-06-09 DIAGNOSIS — R63.5 ABNORMAL WEIGHT GAIN: Primary | ICD-10-CM

## 2022-06-09 DIAGNOSIS — R63.5 ABNORMAL WEIGHT GAIN: ICD-10-CM

## 2022-06-09 LAB
25(OH)D3 SERPL-MCNC: 47.2 NG/ML (ref 30–100)
TSH SERPL DL<=0.05 MIU/L-ACNC: 1.54 UIU/ML (ref 0.27–4.2)

## 2022-06-09 PROCEDURE — 36415 COLL VENOUS BLD VENIPUNCTURE: CPT

## 2022-06-09 PROCEDURE — 82306 VITAMIN D 25 HYDROXY: CPT

## 2022-06-09 PROCEDURE — 83036 HEMOGLOBIN GLYCOSYLATED A1C: CPT

## 2022-06-09 PROCEDURE — 84443 ASSAY THYROID STIM HORMONE: CPT

## 2022-06-10 ENCOUNTER — OFFICE VISIT (OUTPATIENT)
Dept: FAMILY MEDICINE CLINIC | Facility: CLINIC | Age: 52
End: 2022-06-10

## 2022-06-10 VITALS
WEIGHT: 204 LBS | OXYGEN SATURATION: 99 % | DIASTOLIC BLOOD PRESSURE: 78 MMHG | SYSTOLIC BLOOD PRESSURE: 126 MMHG | BODY MASS INDEX: 32.78 KG/M2 | TEMPERATURE: 97.2 F | HEART RATE: 67 BPM | HEIGHT: 66 IN

## 2022-06-10 DIAGNOSIS — F32.A ANXIETY AND DEPRESSION: ICD-10-CM

## 2022-06-10 DIAGNOSIS — F41.9 ANXIETY AND DEPRESSION: ICD-10-CM

## 2022-06-10 DIAGNOSIS — F41.8 SITUATIONAL ANXIETY: ICD-10-CM

## 2022-06-10 DIAGNOSIS — M54.42 CHRONIC BILATERAL LOW BACK PAIN WITH LEFT-SIDED SCIATICA: ICD-10-CM

## 2022-06-10 DIAGNOSIS — M54.12 CERVICAL RADICULOPATHY: Primary | ICD-10-CM

## 2022-06-10 DIAGNOSIS — G89.29 CHRONIC BILATERAL LOW BACK PAIN WITH LEFT-SIDED SCIATICA: ICD-10-CM

## 2022-06-10 LAB — HBA1C MFR BLD: 6.1 % (ref 4.8–5.6)

## 2022-06-10 PROCEDURE — 99213 OFFICE O/P EST LOW 20 MIN: CPT | Performed by: PHYSICIAN ASSISTANT

## 2022-06-10 RX ORDER — BUPROPION HYDROCHLORIDE 300 MG/1
300 TABLET ORAL EVERY MORNING
Qty: 90 TABLET | Refills: 1 | Status: SHIPPED | OUTPATIENT
Start: 2022-06-10 | End: 2022-11-23

## 2022-06-10 RX ORDER — GABAPENTIN 600 MG/1
600 TABLET ORAL 4 TIMES DAILY
Qty: 120 TABLET | Refills: 5 | Status: SHIPPED | OUTPATIENT
Start: 2022-06-10 | End: 2022-12-07 | Stop reason: SDUPTHER

## 2022-06-10 RX ORDER — ALPRAZOLAM 0.5 MG/1
0.5 TABLET ORAL 3 TIMES DAILY PRN
Qty: 90 TABLET | Refills: 5 | Status: SHIPPED | OUTPATIENT
Start: 2022-06-10 | End: 2022-12-07 | Stop reason: SDUPTHER

## 2022-06-10 NOTE — PROGRESS NOTES
Subjective   Violet Barrera is a 51 y.o. female  Peripheral Neuropathy (Follow up on neuropathy, refill on Gabapentin ) and Anxiety (Follow up on anxiety, refill on alprazolam)      History of Present Illness    The patient is a 51-year-old female seen today for follow-up on chronic lumbar radiculopathy and cervical radiculopathy managed with gabapentin and also follow up on generalized anxiety disorder. She is due for refills on Xanax. She is still taking Zoloft daily.    The patient reports that she has been doing well since her last visit. She states that her heart is doing much better now that she has the pacemaker. The patient reports that she is physically feeling stronger and her breathing has improved.     The patient reports that her anxiety is much better. She states that she still has some at night. The patient reports that she does not have any heart palpitations, but her mind takes over at night. She states that she is sleeping well with the medication. The patient reports that her anxiety seems very well controlled through the day.    The patient reports that she is not taking as much of the gabapentin now. She states that it seems like it is keeping her neuropathy under control. The patient reports that it is more so in her arms. She states that she wakes up in the night a lot of times and her arms are tingly.    The patient reports that she does not eat meat. She states that she eats a lot of salad, but she does not have eggs in it. The patient reports that she drinks lots of water.    The patient reports that her allergies are doing well. She states that she does not use Flonase. The patient reports that she is still using Singulair and it seems to be keeping it under control.    The patient reports that she sees a gynecologist. She states that she is up to date on her mammograms.    The following portions of the patient's history were reviewed and updated as appropriate: allergies, current  medications, past social history and problem list    Review of Systems   Constitutional: Positive for activity change. Negative for appetite change and fatigue.   Respiratory: Negative for chest tightness and shortness of breath.    Cardiovascular: Negative.    Gastrointestinal: Negative for abdominal pain, diarrhea and nausea.   Neurological: Positive for numbness. Negative for dizziness, tremors, weakness, light-headedness and headaches.   Psychiatric/Behavioral: Positive for sleep disturbance. Negative for agitation, behavioral problems, confusion, decreased concentration, dysphoric mood, hallucinations, self-injury and suicidal ideas. The patient is nervous/anxious. The patient is not hyperactive.         Stable on medication       Objective     Vitals:    06/10/22 0911   BP: 126/78   Pulse: 67   Temp: 97.2 °F (36.2 °C)   SpO2: 99%       Physical Exam  Vitals and nursing note reviewed.   Constitutional:       General: She is not in acute distress.     Appearance: Normal appearance. She is well-developed. She is obese. She is not ill-appearing, toxic-appearing or diaphoretic.      Comments: BMI32   HENT:      Head: Normocephalic and atraumatic.   Neck:      Thyroid: No thyroid mass or thyromegaly.   Cardiovascular:      Rate and Rhythm: Normal rate and regular rhythm.      Heart sounds: Normal heart sounds.   Pulmonary:      Effort: Pulmonary effort is normal. No respiratory distress.   Skin:     General: Skin is warm and dry.   Neurological:      Mental Status: She is alert and oriented to person, place, and time.   Psychiatric:         Attention and Perception: Attention and perception normal. She is attentive.         Mood and Affect: Mood and affect normal. Mood is not anxious or depressed. Affect is not angry or inappropriate.         Speech: Speech normal.         Behavior: Behavior normal.         Thought Content: Thought content normal.         Cognition and Memory: Cognition normal.         Judgment:  Judgment normal.         Assessment & Plan     Diagnoses and all orders for this visit:    1. Cervical radiculopathy (Primary)    2. Chronic bilateral low back pain with left-sided sciatica  -     gabapentin (NEURONTIN) 600 MG tablet; Take 1 tablet by mouth 4 (Four) Times a Day. For neuropathy  Dispense: 120 tablet; Refill: 5    3. Situational anxiety    4. Anxiety and depression    Other orders  -     sertraline (Zoloft) 50 MG tablet; Take 1 tablet by mouth Daily. New dose, For anxiety  Dispense: 90 tablet; Refill: 3  -     buPROPion XL (WELLBUTRIN XL) 300 MG 24 hr tablet; Take 1 tablet by mouth Every Morning.  Dispense: 90 tablet; Refill: 1      The patient will continue her current regimen.    Will send a 6-month prescription in for current dosage of Xanax electronically to [facility]     As part of this patient's treatment plan, patient will be prescribed controlled substances. The patient has been made aware of appropriate use of such medications, including potential risk of somnolence, limited ability to drive and /or work safely, and potential for dependence or overdose. It has also been made clear that these medications are for use by this patient only, without concomitant use of alcohol or other substances unless prescribed.Controlled substance status of medication discussed with patient, discussed risks of medication including abuse potential and diversion potential and need to follow up for reevaluation appointment in order to receive further refills.    Part of this note may be an electronic transcription/translation of spoken language to printed text using the Dragon Dictation System.      Transcribed from ambient dictation for Kianna Gandhi PA-C by TURDY PELAEZ.  06/10/22   11:23 EDT    Patient verbalized consent to the visit recording.

## 2022-07-26 RX ORDER — MONTELUKAST SODIUM 10 MG/1
TABLET ORAL
Qty: 90 TABLET | Refills: 3 | Status: SHIPPED | OUTPATIENT
Start: 2022-07-26

## 2022-11-23 RX ORDER — BUPROPION HYDROCHLORIDE 300 MG/1
TABLET ORAL
Qty: 90 TABLET | Refills: 3 | Status: SHIPPED | OUTPATIENT
Start: 2022-11-23 | End: 2023-03-10 | Stop reason: DRUGHIGH

## 2022-12-05 RX ORDER — TIZANIDINE 2 MG/1
TABLET ORAL
Qty: 90 TABLET | Refills: 3 | Status: SHIPPED | OUTPATIENT
Start: 2022-12-05

## 2022-12-07 ENCOUNTER — OFFICE VISIT (OUTPATIENT)
Dept: FAMILY MEDICINE CLINIC | Facility: CLINIC | Age: 52
End: 2022-12-07

## 2022-12-07 VITALS
HEIGHT: 66 IN | OXYGEN SATURATION: 99 % | WEIGHT: 199 LBS | TEMPERATURE: 97.2 F | DIASTOLIC BLOOD PRESSURE: 76 MMHG | SYSTOLIC BLOOD PRESSURE: 124 MMHG | BODY MASS INDEX: 31.98 KG/M2 | HEART RATE: 76 BPM

## 2022-12-07 DIAGNOSIS — Z51.81 ENCOUNTER FOR THERAPEUTIC DRUG MONITORING: ICD-10-CM

## 2022-12-07 DIAGNOSIS — G89.29 CHRONIC BILATERAL LOW BACK PAIN WITH LEFT-SIDED SCIATICA: ICD-10-CM

## 2022-12-07 DIAGNOSIS — F41.8 SITUATIONAL ANXIETY: ICD-10-CM

## 2022-12-07 DIAGNOSIS — F41.9 ANXIETY AND DEPRESSION: ICD-10-CM

## 2022-12-07 DIAGNOSIS — M54.42 CHRONIC BILATERAL LOW BACK PAIN WITH LEFT-SIDED SCIATICA: ICD-10-CM

## 2022-12-07 DIAGNOSIS — J01.90 ACUTE NON-RECURRENT SINUSITIS, UNSPECIFIED LOCATION: ICD-10-CM

## 2022-12-07 DIAGNOSIS — F32.A ANXIETY AND DEPRESSION: Primary | ICD-10-CM

## 2022-12-07 DIAGNOSIS — F41.9 ANXIETY AND DEPRESSION: Primary | ICD-10-CM

## 2022-12-07 DIAGNOSIS — F32.A ANXIETY AND DEPRESSION: ICD-10-CM

## 2022-12-07 PROCEDURE — 99214 OFFICE O/P EST MOD 30 MIN: CPT | Performed by: PHYSICIAN ASSISTANT

## 2022-12-07 RX ORDER — GABAPENTIN 600 MG/1
600 TABLET ORAL 4 TIMES DAILY
Qty: 120 TABLET | Refills: 5 | Status: SHIPPED | OUTPATIENT
Start: 2022-12-07

## 2022-12-07 RX ORDER — ALPRAZOLAM 0.5 MG/1
0.5 TABLET ORAL 3 TIMES DAILY PRN
Qty: 90 TABLET | Refills: 5 | Status: SHIPPED | OUTPATIENT
Start: 2022-12-07

## 2022-12-07 RX ORDER — AZITHROMYCIN 250 MG/1
TABLET, FILM COATED ORAL
Qty: 6 TABLET | Refills: 0 | Status: SHIPPED | OUTPATIENT
Start: 2022-12-07 | End: 2023-02-06

## 2022-12-07 NOTE — PROGRESS NOTES
"Preeti Barrera is a 52 y.o. female  Cough (Ongoing cough and fatigue since flu diagnosis on ), Anxiety (Refill on alprazolam for anxiety), and Back Pain (Follow up on back pain/sciatica, rf on gabapentin )      History of Present Illness    The patient,  1970 presents today for a follow-up on chronic back pain with sciatica/radiculopathy, currently stable on gabapentin, due for a refill. The patient also has generalized anxiety, currently stable on Wellbutrin, Zoloft, and Xanax. She is due for refills of Xanax.     The patient also has an ongoing cough and fatigue since having the flu last month. The patient reports that she has been coughing and not feeling well since she had the flu last month. She states that she has been experiencing shortness of breath when she gets up and moves around a lot. She denies any cardiac issues. The patient has an echocardiogram scheduled for 2022. The patient states she feels like sinus pressure and at times feels like \"the world is spinning around\". The patient reports that she is no longer running a fever or feeling sick. She states that she does not think she is draining anything from her sinuses. The patient reports that she has been sleeping well for the most part. The patient reports that she still has concern about her pacemaker and is aware it is there, but understands she needs time to get used to it. The patient is allergic to PENICILLIN.    The following portions of the patient's history were reviewed and updated as appropriate: allergies, current medications, past social history and problem list    Review of Systems   Constitutional: Negative.  Negative for appetite change and fatigue.   HENT: Positive for congestion and postnasal drip.    Respiratory: Positive for cough and shortness of breath. Negative for chest tightness.    Gastrointestinal: Negative.  Negative for abdominal pain, diarrhea and nausea.   Genitourinary: Negative.  "   Musculoskeletal: Positive for back pain ( stable on medication). Negative for arthralgias, gait problem and myalgias.   Neurological: Negative for dizziness, tremors, weakness, light-headedness, numbness and headaches.   Psychiatric/Behavioral: Positive for sleep disturbance. Negative for agitation, behavioral problems, confusion, decreased concentration, dysphoric mood, hallucinations, self-injury and suicidal ideas. The patient is nervous/anxious. The patient is not hyperactive.         Stable on medication       Objective     Vitals:    12/07/22 1327   BP: 124/76   Pulse: 76   Temp: 97.2 °F (36.2 °C)   SpO2: 99%       Physical Exam  Vitals and nursing note reviewed.   Constitutional:       General: She is not in acute distress.     Appearance: Normal appearance. She is well-developed. She is not ill-appearing, toxic-appearing or diaphoretic.   HENT:      Head: Normocephalic and atraumatic.      Right Ear: Tympanic membrane and ear canal normal.      Left Ear: Tympanic membrane and ear canal normal.      Nose: Mucosal edema and rhinorrhea present.      Right Sinus: Maxillary sinus tenderness and frontal sinus tenderness present.      Left Sinus: Maxillary sinus tenderness and frontal sinus tenderness present.      Mouth/Throat:      Pharynx: No oropharyngeal exudate.   Eyes:      Conjunctiva/sclera: Conjunctivae normal.      Pupils: Pupils are equal, round, and reactive to light.   Cardiovascular:      Rate and Rhythm: Normal rate and regular rhythm.   Pulmonary:      Effort: Pulmonary effort is normal. No respiratory distress.      Breath sounds: Normal breath sounds.   Musculoskeletal:         General: No tenderness. Normal range of motion.   Skin:     General: Skin is dry.      Coloration: Skin is not pale.      Findings: No erythema or rash.   Neurological:      Mental Status: She is alert and oriented to person, place, and time.      Coordination: Coordination normal.   Psychiatric:         Attention and  Perception: She is attentive.         Mood and Affect: Mood normal.         Speech: Speech normal.         Behavior: Behavior normal.         Thought Content: Thought content normal.         Judgment: Judgment normal.         Assessment & Plan     Chronic back pain with sciatica/radiculopathy.  The patient is currently stable on gabapentin. She is due for a refill.    Generalized anxiety.  The patient is currently stable on Wellbutrin, Zoloft, and Xanax. She is due for refills of Xanax.    Ongoing cough and fatigue.  The patient has an upcoming appointment with cardiology on 12/21/2022.  Will prescribe a Z-Arias.     Diagnoses and all orders for this visit:    1. Anxiety and depression (Primary)    2. Chronic bilateral low back pain with left-sided sciatica  -     gabapentin (NEURONTIN) 600 MG tablet; Take 1 tablet by mouth 4 (Four) Times a Day. For neuropathy  Dispense: 120 tablet; Refill: 5    3. Acute non-recurrent sinusitis, unspecified location    4. Encounter for therapeutic drug monitoring  -     Compliance Drug Analysis, Ur - Urine, Clean Catch; Future    Other orders  -     azithromycin (Zithromax Z-Arias) 250 MG tablet; Take 2 tablets by mouth on day 1, then 1 tablet daily on days 2-5  Dispense: 6 tablet; Refill: 0    Controlled substance agreement updated, urine drug screen obtained, will electronically submit a 6-month prescription for current dosage of Xanax 0.5 mg 3 times daily #90 with 5 refills per Dr. Ibarra.  Follow-up in 6 months and as needed.    As part of this patient's treatment plan, patient will be prescribed controlled substances. The patient has been made aware of appropriate use of such medications, including potential risk of somnolence, limited ability to drive and /or work safely, and potential for dependence or overdose. It has also been made clear that these medications are for use by this patient only, without concomitant use of alcohol or other substances unless  prescribed.Controlled substance status of medication discussed with patient, discussed risks of medication including abuse potential and diversion potential and need to follow up for reevaluation appointment in order to receive further refills.    Part of this note may be an electronic transcription/translation of spoken language to printed text using the Dragon Dictation System.    Transcribed from ambient dictation for Kianna Gandhi PA-C by Eva Orta.  12/07/22   14:38 EST    Patient or patient representative verbalized consent to the visit recording.  I have personally performed the services described in this document as transcribed by the above individual, and it is both accurate and complete.  Kianna Gandhi PA-C  12/8/2022  12:50 EST

## 2022-12-17 LAB — DRUGS UR: NORMAL

## 2023-01-18 ENCOUNTER — TRANSCRIBE ORDERS (OUTPATIENT)
Dept: FAMILY MEDICINE CLINIC | Facility: CLINIC | Age: 53
End: 2023-01-18
Payer: COMMERCIAL

## 2023-01-18 DIAGNOSIS — Z12.31 ENCOUNTER FOR SCREENING MAMMOGRAM FOR BREAST CANCER: Primary | ICD-10-CM

## 2023-02-06 ENCOUNTER — OFFICE VISIT (OUTPATIENT)
Dept: FAMILY MEDICINE CLINIC | Facility: CLINIC | Age: 53
End: 2023-02-06
Payer: COMMERCIAL

## 2023-02-06 VITALS
OXYGEN SATURATION: 96 % | DIASTOLIC BLOOD PRESSURE: 80 MMHG | TEMPERATURE: 97.9 F | HEART RATE: 73 BPM | SYSTOLIC BLOOD PRESSURE: 130 MMHG | HEIGHT: 66 IN | WEIGHT: 201.1 LBS | BODY MASS INDEX: 32.32 KG/M2

## 2023-02-06 DIAGNOSIS — M54.2 CHRONIC NECK PAIN: ICD-10-CM

## 2023-02-06 DIAGNOSIS — M50.30 DDD (DEGENERATIVE DISC DISEASE), CERVICAL: ICD-10-CM

## 2023-02-06 DIAGNOSIS — M54.12 CERVICAL RADICULOPATHY: Primary | ICD-10-CM

## 2023-02-06 DIAGNOSIS — G89.29 CHRONIC NECK PAIN: ICD-10-CM

## 2023-02-06 PROCEDURE — 99213 OFFICE O/P EST LOW 20 MIN: CPT | Performed by: PHYSICIAN ASSISTANT

## 2023-02-06 NOTE — PROGRESS NOTES
"Subjective   Violet Barrera is a 52 y.o. female  Neck Pain (Neck pain and bilateral shoulder pain x2-3 weeks )      History of Present Illness   The patient is a 52-year-old female who presents today to discuss cervical spine and shoulder pain.    The patient reports that she is experiencing pain in her cervical spine and bilateral shoulders. She states that the pain has been worse lately, especially when she sleeps. The pain radiates down her shoulders and down to her elbow affecting the bilateral upper extremities, but not at the same time. Occasionally, when she raises her upper extremities, she can feel something catching.  The patient does experience paresthesias, mostly at night. In the past, she has had low back pain, but after performing the exercises she was given, this improved. The patient is administering gabapentin, but she has not administered tizanidine for a while. On palpation, her cervical spine is not painful to touch, but it is more when she moves her head from side-to-side. She notes that she does not have much neck rotation, and twisting is not as bad. The patient is feeling much better than she did. She reports that her stamina has not been normal for 2 years, and she is now in rehab for that. The patient's brother-in-law is a , and he spoke with her doctors to see exactly what her limitations were. The patient recently started a program, and she is feeling better. She notes that she has some good days and some bad days, but she believes that the bad days are related to her medications. She does not want to administer any more medication. She reports that when she cleans her house, she needs to take breaks, but then she is \"good to go.\"     The patient reports some skin tags that she would like to have removed.    The following portions of the patient's history were reviewed and updated as appropriate: allergies, current medications, past social history and problem " list    Review of Systems   HENT: Negative for sore throat, trouble swallowing and voice change.    Respiratory: Negative for shortness of breath.    Musculoskeletal: Positive for myalgias, neck pain and neck stiffness.   Skin: Negative for rash and wound.   Neurological: Positive for numbness.   Hematological: Negative for adenopathy.   Psychiatric/Behavioral: Positive for sleep disturbance.       Objective     Vitals:    02/06/23 1352   BP: 130/80   Pulse: 73   Temp: 97.9 °F (36.6 °C)   SpO2: 96%       Physical Exam  Vitals and nursing note reviewed.   Constitutional:       General: She is not in acute distress.     Appearance: Normal appearance. She is well-developed. She is not ill-appearing, toxic-appearing or diaphoretic.   HENT:      Head: Normocephalic and atraumatic.   Neck:      Thyroid: No thyromegaly.      Vascular: No JVD.      Trachea: No tracheal deviation.   Pulmonary:      Effort: Pulmonary effort is normal. No respiratory distress.      Breath sounds: Normal breath sounds.   Musculoskeletal:         General: Tenderness present.      Cervical back: Spinous process tenderness and muscular tenderness present. Decreased range of motion.      Comments: Decreased ROM   Lymphadenopathy:      Cervical: No cervical adenopathy.   Skin:     General: Skin is warm and dry.   Neurological:      Mental Status: She is alert and oriented to person, place, and time.      Sensory: No sensory deficit.      Motor: No weakness.      Coordination: Coordination normal.   Psychiatric:         Mood and Affect: Mood normal.         Behavior: Behavior normal.         Assessment & Plan     1. Chronic neck pain  We will obtain an MRI of the neck.  We will increase her tizanidine to 4 mg at nighttime.    2. Skin tags  She will schedule an appointment to have skin tags removed.    Diagnoses and all orders for this visit:    1. Cervical radiculopathy (Primary)  -     MRI Cervical Spine Without Contrast; Future    2. Chronic neck  pain  -     MRI Cervical Spine Without Contrast; Future    3. DDD (degenerative disc disease), cervical  -     MRI Cervical Spine Without Contrast; Future       Answers for HPI/ROS submitted by the patient on 1/31/2023  Please describe your symptoms.: I am having pain in my neck and shoulders the run to down to the elbow  Have you had these symptoms before?: No  How long have you been having these symptoms?: Greater than 2 weeks  Please list any medications you are currently taking for this condition.: over the counter Mortin  Please describe any probable cause for these symptoms. : not really sure  What is the primary reason for your visit?: Other    Transcribed from ambient dictation for Kianna Gandhi PA-C by Patience Barreto.  02/06/23   15:42 EST    Patient or patient representative verbalized consent to the visit recording.  I have personally performed the services described in this document as transcribed by the above individual, and it is both accurate and complete.  Kianna Gandhi PA-C  2/6/2023  17:01 EST

## 2023-02-11 ENCOUNTER — HOSPITAL ENCOUNTER (OUTPATIENT)
Dept: MAMMOGRAPHY | Facility: HOSPITAL | Age: 53
Discharge: HOME | End: 2023-02-11
Admitting: PHYSICIAN ASSISTANT
Payer: COMMERCIAL

## 2023-02-11 DIAGNOSIS — Z12.31 ENCOUNTER FOR SCREENING MAMMOGRAM FOR BREAST CANCER: ICD-10-CM

## 2023-02-11 PROCEDURE — 77063 BREAST TOMOSYNTHESIS BI: CPT

## 2023-02-11 PROCEDURE — 77067 SCR MAMMO BI INCL CAD: CPT | Performed by: RADIOLOGY

## 2023-02-11 PROCEDURE — 77063 BREAST TOMOSYNTHESIS BI: CPT | Performed by: RADIOLOGY

## 2023-02-11 PROCEDURE — 77067 SCR MAMMO BI INCL CAD: CPT

## 2023-03-10 ENCOUNTER — PROCEDURE VISIT (OUTPATIENT)
Dept: FAMILY MEDICINE CLINIC | Facility: CLINIC | Age: 53
End: 2023-03-10
Payer: COMMERCIAL

## 2023-03-10 VITALS
BODY MASS INDEX: 31.76 KG/M2 | OXYGEN SATURATION: 99 % | SYSTOLIC BLOOD PRESSURE: 134 MMHG | DIASTOLIC BLOOD PRESSURE: 78 MMHG | HEART RATE: 76 BPM | HEIGHT: 66 IN | WEIGHT: 197.6 LBS | TEMPERATURE: 97.2 F

## 2023-03-10 DIAGNOSIS — L91.8 MULTIPLE ACQUIRED SKIN TAGS: Primary | ICD-10-CM

## 2023-03-10 DIAGNOSIS — F41.9 ANXIETY AND DEPRESSION: ICD-10-CM

## 2023-03-10 DIAGNOSIS — F32.A ANXIETY AND DEPRESSION: ICD-10-CM

## 2023-03-10 DIAGNOSIS — E53.8 B12 DEFICIENCY: ICD-10-CM

## 2023-03-10 PROCEDURE — 99214 OFFICE O/P EST MOD 30 MIN: CPT | Performed by: PHYSICIAN ASSISTANT

## 2023-03-10 PROCEDURE — 11200 RMVL SKIN TAGS UP TO&INC 15: CPT | Performed by: PHYSICIAN ASSISTANT

## 2023-03-10 PROCEDURE — 96372 THER/PROPH/DIAG INJ SC/IM: CPT | Performed by: PHYSICIAN ASSISTANT

## 2023-03-10 RX ORDER — BUPROPION HYDROCHLORIDE 150 MG/1
150 TABLET ORAL EVERY MORNING
Qty: 30 TABLET | Refills: 11 | Status: SHIPPED | OUTPATIENT
Start: 2023-03-10

## 2023-03-10 RX ADMIN — CYANOCOBALAMIN 1000 MCG: 1000 INJECTION, SOLUTION INTRAMUSCULAR; SUBCUTANEOUS at 11:49

## 2023-03-10 NOTE — PROGRESS NOTES
Subjective   Violet Barrera is a 52 y.o. female  Skin Problem (Wants skin tags on neck looked at and possibly removed)      History of Present Illness    The patient presents today for skin tag removal due to irritation and pain with multiple skin tags.    The following portions of the patient's history were reviewed and updated as appropriate: allergies, current medications, past social history and problem list    Review of Systems   Constitutional: Negative.    Skin:        Painful irritated skin tags on neck   Psychiatric/Behavioral: Negative.  Negative for dysphoric mood.       Objective     Vitals:    03/10/23 0908   BP: 134/78   Pulse: 76   Temp: 97.2 °F (36.2 °C)   SpO2: 99%       Physical Exam  Vitals and nursing note reviewed.   Constitutional:       General: She is not in acute distress.     Appearance: Normal appearance. She is well-developed. She is not ill-appearing, toxic-appearing or diaphoretic.   Eyes:      Conjunctiva/sclera: Conjunctivae normal.   Cardiovascular:      Rate and Rhythm: Normal rate and regular rhythm.   Pulmonary:      Effort: Pulmonary effort is normal.      Breath sounds: Normal breath sounds.   Skin:     Comments: 3 separate irritated inflamed tender skin tags on anterior lateral neck, each removed under sterile technique, patient tolerated procedure well, routine wound care given, hemostasis with Drysol.   Neurological:      Mental Status: She is alert and oriented to person, place, and time.      Coordination: Coordination normal.   Psychiatric:         Attention and Perception: She is attentive.         Mood and Affect: Mood normal.         Behavior: Behavior normal.         Thought Content: Thought content normal.         Judgment: Judgment normal.         Assessment & Plan     Procedure: Skin tag removal  After obtaining patient's consent under sterile technique, each of the 3 were locally anesthetized with plain lidocaine and removed under sterile procedure through  excision. Routine wound care given, hemostasis with Drysol, routine wound care discussed.    B12 deficiency  Patient is also here for B12 shot due to B12 deficiency that be given today.    Depression  This is very well controlled. She wants to lower her dose of Wellbutrin down from 300 mg to 150 mg, new prescription will be sent and patient will follow up in 1 month if having any need to raise dosage back or sooner if depression worsens.      Diagnoses and all orders for this visit:    1. Multiple acquired skin tags (Primary)    2. Anxiety and depression    3. B12 deficiency    Other orders  -     buPROPion XL (Wellbutrin XL) 150 MG 24 hr tablet; Take 1 tablet by mouth Every Morning.  Dispense: 30 tablet; Refill: 11     I spent 30 minutes in patient care: Reviewing records prior to the visit, examining the patient, entering orders and documentation    Part of this note may be an electronic transcription/translation of spoken language to printed text using the Dragon Dictation System.    Transcribed from ambient dictation for Kianna Gandhi PA-C by Eva Orta.  03/10/23   10:01 EST    Patient or patient representative verbalized consent to the visit recording.  I have personally performed the services described in this document as transcribed by the above individual, and it is both accurate and complete.  Kianna Gandhi PA-C  3/10/2023  13:57 EST

## 2023-04-12 ENCOUNTER — HOSPITAL ENCOUNTER (OUTPATIENT)
Dept: MAMMOGRAPHY | Facility: HOSPITAL | Age: 53
Discharge: HOME | End: 2023-04-12
Payer: COMMERCIAL

## 2023-04-12 ENCOUNTER — HOSPITAL ENCOUNTER (OUTPATIENT)
Dept: ULTRASOUND IMAGING | Facility: HOSPITAL | Age: 53
Discharge: HOME | End: 2023-04-12
Payer: COMMERCIAL

## 2023-04-12 DIAGNOSIS — R92.8 ABNORMAL MAMMOGRAM: ICD-10-CM

## 2023-04-12 PROCEDURE — 76642 ULTRASOUND BREAST LIMITED: CPT

## 2023-04-12 PROCEDURE — G0279 TOMOSYNTHESIS, MAMMO: HCPCS

## 2023-04-12 PROCEDURE — 77065 DX MAMMO INCL CAD UNI: CPT

## 2023-04-12 PROCEDURE — 76642 ULTRASOUND BREAST LIMITED: CPT | Performed by: RADIOLOGY

## 2023-04-12 PROCEDURE — 77061 BREAST TOMOSYNTHESIS UNI: CPT | Performed by: RADIOLOGY

## 2023-04-12 PROCEDURE — 77065 DX MAMMO INCL CAD UNI: CPT | Performed by: RADIOLOGY

## 2023-04-13 ENCOUNTER — TRANSCRIBE ORDERS (OUTPATIENT)
Dept: MAMMOGRAPHY | Facility: HOSPITAL | Age: 53
End: 2023-04-13
Payer: COMMERCIAL

## 2023-04-13 DIAGNOSIS — R92.8 ABNORMAL MAMMOGRAM: Primary | ICD-10-CM

## 2023-04-20 ENCOUNTER — OFFICE VISIT (OUTPATIENT)
Dept: FAMILY MEDICINE CLINIC | Facility: CLINIC | Age: 53
End: 2023-04-20
Payer: COMMERCIAL

## 2023-04-20 ENCOUNTER — LAB (OUTPATIENT)
Dept: FAMILY MEDICINE CLINIC | Facility: CLINIC | Age: 53
End: 2023-04-20
Payer: COMMERCIAL

## 2023-04-20 VITALS
BODY MASS INDEX: 32.78 KG/M2 | SYSTOLIC BLOOD PRESSURE: 132 MMHG | HEART RATE: 83 BPM | DIASTOLIC BLOOD PRESSURE: 78 MMHG | TEMPERATURE: 97.8 F | OXYGEN SATURATION: 97 % | HEIGHT: 66 IN | WEIGHT: 204 LBS

## 2023-04-20 DIAGNOSIS — R25.2 LEG CRAMPS: Primary | ICD-10-CM

## 2023-04-20 DIAGNOSIS — R00.2 PALPITATIONS: ICD-10-CM

## 2023-04-20 DIAGNOSIS — R60.0 LOCALIZED EDEMA: ICD-10-CM

## 2023-04-20 LAB
DEPRECATED RDW RBC AUTO: 42.7 FL (ref 37–54)
ERYTHROCYTE [DISTWIDTH] IN BLOOD BY AUTOMATED COUNT: 13.3 % (ref 12.3–15.4)
HCT VFR BLD AUTO: 38.9 % (ref 34–46.6)
HGB BLD-MCNC: 13.3 G/DL (ref 12–15.9)
MCH RBC QN AUTO: 30.2 PG (ref 26.6–33)
MCHC RBC AUTO-ENTMCNC: 34.2 G/DL (ref 31.5–35.7)
MCV RBC AUTO: 88.4 FL (ref 79–97)
PLATELET # BLD AUTO: 308 10*3/MM3 (ref 140–450)
PMV BLD AUTO: 11.2 FL (ref 6–12)
RBC # BLD AUTO: 4.4 10*6/MM3 (ref 3.77–5.28)
WBC NRBC COR # BLD: 8.53 10*3/MM3 (ref 3.4–10.8)

## 2023-04-20 PROCEDURE — 80053 COMPREHEN METABOLIC PANEL: CPT | Performed by: PHYSICIAN ASSISTANT

## 2023-04-20 PROCEDURE — 84466 ASSAY OF TRANSFERRIN: CPT | Performed by: PHYSICIAN ASSISTANT

## 2023-04-20 PROCEDURE — 84443 ASSAY THYROID STIM HORMONE: CPT | Performed by: PHYSICIAN ASSISTANT

## 2023-04-20 PROCEDURE — 85027 COMPLETE CBC AUTOMATED: CPT | Performed by: PHYSICIAN ASSISTANT

## 2023-04-20 PROCEDURE — 83735 ASSAY OF MAGNESIUM: CPT | Performed by: PHYSICIAN ASSISTANT

## 2023-04-20 PROCEDURE — 83540 ASSAY OF IRON: CPT | Performed by: PHYSICIAN ASSISTANT

## 2023-04-20 PROCEDURE — 99213 OFFICE O/P EST LOW 20 MIN: CPT | Performed by: PHYSICIAN ASSISTANT

## 2023-04-20 PROCEDURE — 84439 ASSAY OF FREE THYROXINE: CPT | Performed by: PHYSICIAN ASSISTANT

## 2023-04-20 NOTE — PROGRESS NOTES
"Subjective   Violet Barrera is a 52 y.o. female  Edema (Swelling in legs since last week , req bloodwork)      History of Present Illness     The patient presents today for evaluation of bilateral lower extremity edema. She is accompanied by her  today.    The patient reports that 3 days ago, her bilateral lower extremities began to swell to the point where she thought they were going to \"explode\". She denies any redness, warmth, or infection. The patient denies any sudden change in her weight. She states that she was urinating fine. The patient reports that she drinks 10 to 12 bottles of water a day. She states that she has cut back on water. The patient reports that she has been having muscle spasms for the last 1.5 weeks. She states that she was having muscle cramps and then her legs swelled, she added on more electrolytes. The patient reports that the muscle cramps did not improve. She states that it is mostly in the middle of the night. The patient reports that she is on a diuretic, spironolactone 25 mg once a day. She states that she is still taking her sertraline. The patient reports that her anxiety is good. She states that she is still taking gabapentin 4 times a day. The patient reports that she is taking her Xanax every night and as needed. She states that she is sleeping 4.5 to 5 hours a night. The patient reports that she does not fall back asleep once she wakes up. She states that she has an occasional panic attack at night. The patient reports that she has not been doing more physical work or walking. She states that she has not been on a car trip for hours in the car. The patient reports that she is still watching her granddaughter. She states that when she was swollen, she felt a fullness. The patient reports that she did check her blood pressure that day and it was fine. She denies any blood in her urine or stool. The patient reports that she is careful with salt. She denies any stomach " ulcer symptoms or stomach pain.    The following portions of the patient's history were reviewed and updated as appropriate: allergies, current medications, past social history and problem list    Review of Systems   Constitutional: Negative for activity change, appetite change, chills, diaphoresis, fatigue and unexpected weight change.   Respiratory: Negative for cough and shortness of breath.    Cardiovascular: Positive for palpitations and leg swelling. Negative for chest pain.   Gastrointestinal: Negative.  Negative for abdominal distention.   Genitourinary: Negative.    Musculoskeletal: Positive for myalgias.   Neurological: Negative.    Psychiatric/Behavioral: Positive for sleep disturbance.       Objective     Vitals:    04/20/23 1415   BP: 132/78   Pulse: 83   Temp: 97.8 °F (36.6 °C)   SpO2: 97%       Physical Exam  Vitals and nursing note reviewed.   Constitutional:       General: She is not in acute distress.     Appearance: Normal appearance. She is well-developed. She is not ill-appearing, toxic-appearing or diaphoretic.   Neck:      Vascular: No carotid bruit or JVD.   Cardiovascular:      Rate and Rhythm: Normal rate and regular rhythm.      Pulses: Normal pulses.      Heart sounds: Normal heart sounds. No murmur heard.  Pulmonary:      Effort: Pulmonary effort is normal. No respiratory distress.      Breath sounds: Normal breath sounds. No wheezing.   Abdominal:      Palpations: Abdomen is soft.      Tenderness: There is no abdominal tenderness.   Musculoskeletal:         General: No swelling or tenderness.      Right lower leg: No edema.      Left lower leg: No edema.   Skin:     General: Skin is warm and dry.      Coloration: Skin is not pale.      Findings: No erythema or rash.   Neurological:      Mental Status: She is alert and oriented to person, place, and time.   Psychiatric:         Mood and Affect: Mood normal.         Behavior: Behavior normal.         Assessment & Plan     Diagnoses and  all orders for this visit:    1. Leg cramps (Primary)  -     CBC (No Diff); Future  -     Iron Profile; Future  -     Comprehensive metabolic panel; Future  -     Magnesium; Future  -     TSH; Future  -     T4, Free; Future  -     CBC (No Diff)  -     Iron Profile  -     Comprehensive metabolic panel  -     Magnesium  -     TSH  -     T4, Free    2. Localized edema  -     CBC (No Diff); Future  -     Iron Profile; Future  -     Comprehensive metabolic panel; Future  -     Magnesium; Future  -     TSH; Future  -     T4, Free; Future  -     CBC (No Diff)  -     Iron Profile  -     Comprehensive metabolic panel  -     Magnesium  -     TSH  -     T4, Free    3. Palpitations  -     CBC (No Diff); Future  -     Iron Profile; Future  -     Comprehensive metabolic panel; Future  -     Magnesium; Future  -     TSH; Future  -     T4, Free; Future  -     CBC (No Diff)  -     Iron Profile  -     Comprehensive metabolic panel  -     Magnesium  -     TSH  -     T4, Free       1. Bilateral lower extremity edema (Primary)  - CBC & Differential; Future  - Comprehensive Metabolic Panel; Future    2. Muscle cramps  - CBC & Differential; Future    Transcribed from ambient dictation for Kianna Gandhi PA-C by Wendy Murphy.  04/20/23   18:10 EDT    Patient or patient representative verbalized consent to the visit recording.  I have personally performed the services described in this document as transcribed by the above individual, and it is both accurate and complete.  Kianna Gandhi PA-C  4/21/2023  12:57 EDT

## 2023-04-21 ENCOUNTER — TELEPHONE (OUTPATIENT)
Dept: FAMILY MEDICINE CLINIC | Facility: CLINIC | Age: 53
End: 2023-04-21
Payer: COMMERCIAL

## 2023-04-21 LAB
ALBUMIN SERPL-MCNC: 4.3 G/DL (ref 3.5–5.2)
ALBUMIN/GLOB SERPL: 1.6 G/DL
ALP SERPL-CCNC: 63 U/L (ref 39–117)
ALT SERPL W P-5'-P-CCNC: 26 U/L (ref 1–33)
ANION GAP SERPL CALCULATED.3IONS-SCNC: 9.8 MMOL/L (ref 5–15)
AST SERPL-CCNC: 25 U/L (ref 1–32)
BILIRUB SERPL-MCNC: <0.2 MG/DL (ref 0–1.2)
BUN SERPL-MCNC: 16 MG/DL (ref 6–20)
BUN/CREAT SERPL: 16.7 (ref 7–25)
CALCIUM SPEC-SCNC: 10.3 MG/DL (ref 8.6–10.5)
CHLORIDE SERPL-SCNC: 102 MMOL/L (ref 98–107)
CO2 SERPL-SCNC: 26.2 MMOL/L (ref 22–29)
CREAT SERPL-MCNC: 0.96 MG/DL (ref 0.57–1)
EGFRCR SERPLBLD CKD-EPI 2021: 70.5 ML/MIN/1.73
GLOBULIN UR ELPH-MCNC: 2.7 GM/DL
GLUCOSE SERPL-MCNC: 94 MG/DL (ref 65–99)
IRON 24H UR-MRATE: 63 MCG/DL (ref 37–145)
IRON SATN MFR SERPL: 14 % (ref 20–50)
MAGNESIUM SERPL-MCNC: 2.3 MG/DL (ref 1.6–2.6)
POTASSIUM SERPL-SCNC: 4.4 MMOL/L (ref 3.5–5.2)
PROT SERPL-MCNC: 7 G/DL (ref 6–8.5)
SODIUM SERPL-SCNC: 138 MMOL/L (ref 136–145)
T4 FREE SERPL-MCNC: 1.13 NG/DL (ref 0.93–1.7)
TIBC SERPL-MCNC: 465 MCG/DL (ref 298–536)
TRANSFERRIN SERPL-MCNC: 312 MG/DL (ref 200–360)
TSH SERPL DL<=0.05 MIU/L-ACNC: 0.84 UIU/ML (ref 0.27–4.2)

## 2023-04-21 NOTE — TELEPHONE ENCOUNTER
"  Caller: Violet Barrera \"Therese\"    Relationship: Self    Best call back number: [phone]    What was the call regarding: PATIENT STATES THAT SHE WOULD LIKE A CALL ABOUT HER RECENT LAB RESULTS.     Do you require a callback: YES        "

## 2023-05-31 ENCOUNTER — OFFICE VISIT (OUTPATIENT)
Dept: FAMILY MEDICINE CLINIC | Facility: CLINIC | Age: 53
End: 2023-05-31

## 2023-05-31 ENCOUNTER — HOSPITAL ENCOUNTER (OUTPATIENT)
Dept: GENERAL RADIOLOGY | Facility: HOSPITAL | Age: 53
Discharge: HOME | End: 2023-05-31
Admitting: PHYSICIAN ASSISTANT

## 2023-05-31 VITALS
BODY MASS INDEX: 33.12 KG/M2 | OXYGEN SATURATION: 98 % | HEIGHT: 66 IN | WEIGHT: 206.1 LBS | DIASTOLIC BLOOD PRESSURE: 74 MMHG | HEART RATE: 79 BPM | SYSTOLIC BLOOD PRESSURE: 128 MMHG | TEMPERATURE: 97.8 F

## 2023-05-31 DIAGNOSIS — M25.571 ACUTE RIGHT ANKLE PAIN: Primary | ICD-10-CM

## 2023-05-31 DIAGNOSIS — S93.421A SPRAIN OF RIGHT MEDIAL ANKLE JOINT, INITIAL ENCOUNTER: ICD-10-CM

## 2023-05-31 DIAGNOSIS — M25.571 ACUTE RIGHT ANKLE PAIN: ICD-10-CM

## 2023-05-31 PROCEDURE — 73610 X-RAY EXAM OF ANKLE: CPT

## 2023-05-31 NOTE — PROGRESS NOTES
Subjective   Violet Barrera is a 52 y.o. female  Ankle Injury (Right ankle pain/swelling after jumping off porch last week )      History of Present Illness  The patient is a 52-year-old female seen today to discuss right ankle pain.    The patient reports that  approximately 1.5 weeks ago she injured her right ankle while stepping down from a step. She states she thought it would get better, but every night it is swollen and tender. She states that she was able to bear weight on her right ankle after the injury and is still able to. The patient reports that it is not sore on the bone, but it is tender inside. She applied ice to her right ankle only the day of the injury. She states that it hurts down into her arch on the side. She states that every day it was coming back and more aggressive.      The following portions of the patient's history were reviewed and updated as appropriate: allergies, current medications, past social history and problem list    Review of Systems   Constitutional: Positive for activity change.   Cardiovascular: Positive for leg swelling.   Musculoskeletal: Positive for arthralgias and myalgias. Negative for gait problem and joint swelling.   Skin: Negative.    Neurological: Negative for weakness and numbness.       Objective     Vitals:    05/31/23 1155   BP: 128/74   Pulse: 79   Temp: 97.8 °F (36.6 °C)   SpO2: 98%       Physical Exam  Vitals and nursing note reviewed.   Constitutional:       General: She is not in acute distress.     Appearance: Normal appearance. She is well-developed. She is not ill-appearing, toxic-appearing or diaphoretic.   Cardiovascular:      Pulses: Normal pulses.   Musculoskeletal:         General: Swelling ( right ankle) and tenderness ( medial malleolus) present. No deformity or signs of injury. Normal range of motion.   Skin:     General: Skin is warm and dry.      Coloration: Skin is not pale.      Findings: No erythema or rash.   Neurological:      Mental  Status: She is alert.      Motor: No abnormal muscle tone.      Coordination: Coordination normal.      Gait: Gait normal.         Assessment & Plan     Diagnoses and all orders for this visit:    1. Acute right ankle pain (Primary)  -     XR Ankle 3+ View Right; Future    2. Sprain of right medial ankle joint, initial encounter    1. Right ankle pain  - We will obtain an x-ray of the right ankle to rule out a chip fracture.  - She will wear a compression garment during the day.  - She will soak her ankle in Epsom salts and warm water twice a day and stretch it to promote circulation.     Transcribed from ambient dictation for Kianna Gandhi PA-C by Emiliana Shankar.  05/31/23   15:13 EDT    Patient or patient representative verbalized consent to the visit recording.  I have personally performed the services described in this document as transcribed by the above individual, and it is both accurate and complete.  Kianna Gandhi PA-C  5/31/2023  16:03 EDT

## 2023-08-07 ENCOUNTER — OFFICE VISIT (OUTPATIENT)
Dept: FAMILY MEDICINE CLINIC | Facility: CLINIC | Age: 53
End: 2023-08-07
Payer: COMMERCIAL

## 2023-08-07 VITALS
OXYGEN SATURATION: 99 % | SYSTOLIC BLOOD PRESSURE: 136 MMHG | DIASTOLIC BLOOD PRESSURE: 84 MMHG | WEIGHT: 204.8 LBS | HEIGHT: 66 IN | HEART RATE: 71 BPM | BODY MASS INDEX: 32.92 KG/M2 | TEMPERATURE: 97.6 F

## 2023-08-07 DIAGNOSIS — M50.30 DDD (DEGENERATIVE DISC DISEASE), CERVICAL: ICD-10-CM

## 2023-08-07 DIAGNOSIS — M54.42 CHRONIC BILATERAL LOW BACK PAIN WITH LEFT-SIDED SCIATICA: ICD-10-CM

## 2023-08-07 DIAGNOSIS — F41.1 GAD (GENERALIZED ANXIETY DISORDER): Primary | ICD-10-CM

## 2023-08-07 DIAGNOSIS — G89.29 CHRONIC BILATERAL LOW BACK PAIN WITH LEFT-SIDED SCIATICA: ICD-10-CM

## 2023-08-07 DIAGNOSIS — H81.10 BENIGN PAROXYSMAL POSITIONAL VERTIGO, UNSPECIFIED LATERALITY: ICD-10-CM

## 2023-08-07 DIAGNOSIS — E66.9 CLASS 1 OBESITY WITH SERIOUS COMORBIDITY AND BODY MASS INDEX (BMI) OF 33.0 TO 33.9 IN ADULT, UNSPECIFIED OBESITY TYPE: ICD-10-CM

## 2023-08-07 DIAGNOSIS — F41.9 ANXIETY AND DEPRESSION: ICD-10-CM

## 2023-08-07 DIAGNOSIS — I42.9 CARDIOMYOPATHY, UNSPECIFIED TYPE: ICD-10-CM

## 2023-08-07 DIAGNOSIS — F41.8 SITUATIONAL ANXIETY: ICD-10-CM

## 2023-08-07 DIAGNOSIS — F32.A ANXIETY AND DEPRESSION: ICD-10-CM

## 2023-08-07 PROCEDURE — 99214 OFFICE O/P EST MOD 30 MIN: CPT | Performed by: PHYSICIAN ASSISTANT

## 2023-08-07 RX ORDER — GABAPENTIN 600 MG/1
600 TABLET ORAL 4 TIMES DAILY
Qty: 120 TABLET | Refills: 5 | Status: SHIPPED | OUTPATIENT
Start: 2023-08-07

## 2023-08-07 RX ORDER — METHYLPREDNISOLONE 4 MG/1
TABLET ORAL
Qty: 1 EACH | Refills: 0 | Status: SHIPPED | OUTPATIENT
Start: 2023-08-07

## 2023-08-07 RX ORDER — ALPRAZOLAM 0.5 MG/1
0.5 TABLET ORAL 3 TIMES DAILY PRN
Qty: 90 TABLET | Refills: 5 | Status: SHIPPED | OUTPATIENT
Start: 2023-08-07

## 2023-08-07 NOTE — PROGRESS NOTES
Preeti Barrera is a 53 y.o. female  Earache (Left ear pain with dizziness x1 week ), Peripheral Neuropathy (Refill on gabapentin), and Anxiety (Refill on alprazolam )      History of Present Illness    The patient, date of birth 1970, presents today for follow-up on chronic peripheral neuropathy in association with degenerative disc disease. She is stable on gabapentin, due for refills, and also here for a follow-up on generalized anxiety. She is due for refills on alprazolam and to discuss a new symptom of ear pain and dizziness for the past week.    The patient feels that she has fluid in her left ear because she experiences dizziness when lying down. She swims occasionally. She reports intermittent left ear pain. She does not consume alcohol anymore.     She is still taking gabapentin 4 times daily. The patient is taking Xanax once to twice daily. She has had anxiety over the past few weeks due to the death of someone she knows.     The following portions of the patient's history were reviewed and updated as appropriate: allergies, current medications, past social history and problem list    Review of Systems   Constitutional:  Positive for appetite change (Patient has seen a nutritionist is following a healthy diet) and unexpected weight change (Patient has been unable to lose weight since starting on cardiac medications for cardiomyopathy). Negative for activity change and fever.   HENT:  Positive for ear pain (Left ear fullness).    Respiratory:  Negative for chest tightness and shortness of breath.    Cardiovascular:  Negative for chest pain and palpitations.   Gastrointestinal:  Negative for abdominal pain, diarrhea, nausea and vomiting.   Genitourinary:  Negative for difficulty urinating and dysuria.   Neurological:  Positive for dizziness and numbness (Neuropathy/radiculopathy stable on gabapentin). Negative for seizures, syncope, facial asymmetry and weakness.    Psychiatric/Behavioral:  The patient is nervous/anxious (More anxiety lately due to trell's recent death).      Objective     Vitals:    08/07/23 1530   BP: 136/84   Pulse: 71   Temp: 97.6 øF (36.4 øC)   SpO2: 99%       Physical Exam  Vitals and nursing note reviewed.   Constitutional:       General: She is not in acute distress.     Appearance: Normal appearance. She is well-developed. She is obese. She is not ill-appearing, toxic-appearing or diaphoretic.      Comments:  BMI 33   HENT:      Head: Normocephalic and atraumatic.      Right Ear: Hearing, tympanic membrane and ear canal normal.      Left Ear: Hearing, tympanic membrane and ear canal normal.   Eyes:      Extraocular Movements: Extraocular movements intact.      Pupils: Pupils are equal, round, and reactive to light.   Neck:      Vascular: Normal carotid pulses. No carotid bruit.   Cardiovascular:      Rate and Rhythm: Normal rate and regular rhythm.      Pulses: Normal pulses.      Heart sounds: Normal heart sounds.   Pulmonary:      Effort: Pulmonary effort is normal. No respiratory distress.      Breath sounds: Normal breath sounds.   Musculoskeletal:         General: Normal range of motion.   Neurological:      Mental Status: She is alert and oriented to person, place, and time.      Cranial Nerves: No cranial nerve deficit.      Sensory: No sensory deficit.      Motor: No weakness or abnormal muscle tone.      Coordination: Coordination normal.      Gait: Gait normal.      Deep Tendon Reflexes: Reflexes normal.   Psychiatric:         Mood and Affect: Mood normal.         Behavior: Behavior normal.         Thought Content: Thought content normal.         Judgment: Judgment normal.       Assessment & Plan     Diagnoses and all orders for this visit:    1. ZAIRA (generalized anxiety disorder) (Primary)    2. Chronic bilateral low back pain with left-sided sciatica  -     gabapentin (NEURONTIN) 600 MG tablet; Take 1 tablet by mouth 4 (Four) Times a  Day. For neuropathy  Dispense: 120 tablet; Refill: 5    3. Class 1 obesity with serious comorbidity and body mass index (BMI) of 33.0 to 33.9 in adult, unspecified obesity type    4. DDD (degenerative disc disease), cervical    5. Cardiomyopathy, unspecified type    6. Benign paroxysmal positional vertigo, unspecified laterality    Other orders  -     methylPREDNISolone (MEDROL) 4 MG dose pack; Take as directed on package instructions.  Dispense: 1 each; Refill: 0  -     Semaglutide-Weight Management 0.25 MG/0.5ML solution auto-injector; Inject 0.25 mg under the skin into the appropriate area as directed 1 (One) Time Per Week.  Dispense: 2 mL; Refill: 1    Will electronically submit a refill for current dosage of Xanax 0.5 mg 1 3 times daily for anxiety #90 with 5 refills per Dr. Ibarra.  Follow-up in 2 months for recheck for obesity weight management, follow-up in 6 months for recheck of generalized anxiety disorder and radiculopathies.    A Medrol Dosepak will be prescribed to take for 6 days for vertigo. The patient is stable on gabapentin. Alprazolam will be refilled.     As part of this patient's treatment plan, patient will be prescribed controlled substances. The patient has been made aware of appropriate use of such medications, including potential risk of somnolence, limited ability to drive and /or work safely, and potential for dependence or overdose. It has also been made clear that these medications are for use by this patient only, without concomitant use of alcohol or other substances unless prescribed.Controlled substance status of medication discussed with patient, discussed risks of medication including abuse potential and diversion potential and need to follow up for reevaluation appointment in order to receive further refills.    Part of this note may be an electronic transcription/translation of spoken language to printed text using the Dragon Dictation System.      Transcribed from ambient  dictation for Kianna Gandhi PA-C by Loreto Hilliard.  08/07/23   19:30 EDT    Patient or patient representative verbalized consent to the visit recording.  I have personally performed the services described in this document as transcribed by the above individual, and it is both accurate and complete.

## 2023-08-31 NOTE — TELEPHONE ENCOUNTER
"Caller: Violet Barrera \"Therese\"    Relationship: Self    Best call back number: [phone]    Requested Prescriptions:   Requested Prescriptions     Pending Prescriptions Disp Refills    Semaglutide-Weight Management 0.25 MG/0.5ML solution auto-injector 2 mL 1     Sig: Inject 0.25 mg under the skin into the appropriate area as directed 1 (One) Time Per Week.        Pharmacy where request should be sent: [address] [phone] [facility] [phone]      Last office visit with prescribing clinician: 8/7/2023   Last telemedicine visit with prescribing clinician: Visit date not found   Next office visit with prescribing clinician: Visit date not found     Additional details provided by patient:     Does the patient have less than a 3 day supply:  [x] Yes  [] No    Would you like a call back once the refill request has been completed: [x] Yes [] No    If the office needs to give you a call back, can they leave a voicemail: [x] Yes [] No    Ezekiel Ruiz Rep   08/31/23 15:04 EDT       "

## 2023-09-01 NOTE — TELEPHONE ENCOUNTER
Last seen 08/07/2023, spoke to pharmacy, they do not have this in stock, she is  going to confirm with [facility] to see if they have this and will call back

## 2023-09-22 ENCOUNTER — OFFICE VISIT (OUTPATIENT)
Dept: FAMILY MEDICINE CLINIC | Facility: CLINIC | Age: 53
End: 2023-09-22
Payer: COMMERCIAL

## 2023-09-22 VITALS
WEIGHT: 204 LBS | SYSTOLIC BLOOD PRESSURE: 124 MMHG | HEIGHT: 66 IN | DIASTOLIC BLOOD PRESSURE: 70 MMHG | BODY MASS INDEX: 32.78 KG/M2 | HEART RATE: 74 BPM | OXYGEN SATURATION: 96 % | TEMPERATURE: 97.8 F

## 2023-09-22 DIAGNOSIS — G47.01 INSOMNIA DUE TO MEDICAL CONDITION: ICD-10-CM

## 2023-09-22 DIAGNOSIS — G47.39 SLEEP APNEA-LIKE BEHAVIOR: ICD-10-CM

## 2023-09-22 DIAGNOSIS — K21.9 GASTROESOPHAGEAL REFLUX DISEASE, UNSPECIFIED WHETHER ESOPHAGITIS PRESENT: Primary | ICD-10-CM

## 2023-09-22 PROCEDURE — 99213 OFFICE O/P EST LOW 20 MIN: CPT | Performed by: PHYSICIAN ASSISTANT

## 2023-09-22 RX ORDER — RABEPRAZOLE SODIUM 20 MG/1
20 TABLET, DELAYED RELEASE ORAL 2 TIMES DAILY
Qty: 60 TABLET | Refills: 2 | Status: SHIPPED | OUTPATIENT
Start: 2023-09-22

## 2023-09-22 NOTE — PROGRESS NOTES
Subjective   Violet Barrera is a 53 y.o. female  Insomnia (Issues with sleeping the past 2-3 weeks, concerned about breathing when sleeping/ sleep apnea and having daytime sleepiness)      Insomnia  Pertinent negatives include no abdominal pain, chest pain, coughing, diaphoresis or nausea.       Violet Barrera, date of birth 1970, presents today for evaluation of ongoing insomnia.    The patient reports that she is not breathing well. Her breathing has been short and brief, and she has never had issues with breathing in the past. She mentions she has been taking over-the-counter Nexium daily. She feels bloated all the time. She reports her symptoms started getting worse in the last 3 weeks and she has gained so much weight. She denies eating late at night nor drinking carbonated drinks. She is not sure if she has trouble with swallowing, but she feels food does not move down her digestive system.  She notes she feels pressure in her chest and sleeps in a sitting position. She had an endoscopy several years ago.     The following portions of the patient's history were reviewed and updated as appropriate: allergies, current medications, past social history and problem list    Review of Systems   Constitutional:  Negative for appetite change, diaphoresis and unexpected weight change.   HENT:  Negative for postnasal drip and sinus pressure.    Respiratory:  Positive for shortness of breath. Negative for cough, choking, chest tightness and wheezing.    Cardiovascular:  Negative for chest pain and leg swelling.   Gastrointestinal:  Negative for abdominal distention, abdominal pain, diarrhea and nausea.        Patient experiencing heartburn/acid reflux     Psychiatric/Behavioral:  The patient has insomnia.      Objective     Vitals:    09/22/23 0905   BP: 124/70   Pulse: 74   Temp: 97.8 °F (36.6 °C)   SpO2: 96%       Physical Exam  Vitals and nursing note reviewed.   Constitutional:       General: She is not in  acute distress.     Appearance: Normal appearance. She is well-developed. She is obese. She is not ill-appearing, toxic-appearing or diaphoretic.      Comments: [id number] noted     Neck:      Thyroid: No thyromegaly.   Cardiovascular:      Rate and Rhythm: Normal rate and regular rhythm.      Heart sounds: Normal heart sounds. No murmur heard.  Pulmonary:      Effort: Pulmonary effort is normal. No respiratory distress.      Breath sounds: Normal breath sounds.   Abdominal:      Palpations: Abdomen is soft. There is no mass.      Tenderness: There is no abdominal tenderness.   Neurological:      Mental Status: She is alert.   Psychiatric:         Mood and Affect: Mood normal.         Behavior: Behavior normal.         Thought Content: Thought content normal.         Judgment: Judgment normal.       Assessment & Plan     Diagnoses and all orders for this visit:    1. Gastroesophageal reflux disease, unspecified whether esophagitis present (Primary)  -     Ambulatory referral for Screening EGD    2. Sleep apnea-like behavior  -     Ambulatory Referral to Sleep Medicine    3. Insomnia due to medical condition    Other orders  -     RABEprazole (Aciphex) 20 MG EC tablet; Take 1 tablet by mouth 2 (Two) Times a Day. For GERD  Dispense: 60 tablet; Refill: 2       1. Insomnia  - I will refer the patient to the sleep medicine department so she can have a sleep study done.    2. Acid reflux  - A prescription for AcipHex will be sent to be taken twice daily in the morning with breakfast and at night. She will discontinue Nexium. I will also order an esophagogastroduodenoscopy with the gastroenterologists. If her symptoms do not improve with the medications, she can get Mylanta extra strength anti-gas to take before bed to help with bloating.   Transcribed from ambient dictation for Kianna Gandhi PA-C by Miguel Khoury.  09/22/23   11:04 EDT    Patient or patient representative verbalized consent to the visit  recording.  I have personally performed the services described in this document as transcribed by the above individual, and it is both accurate and complete.    Answers submitted by the patient for this visit:  Other (Submitted on 9/21/2023)  Please describe your symptoms.: Breathing during sleep  Have you had these symptoms before?: No  How long have you been having these symptoms?: Greater than 2 weeks  Primary Reason for Visit (Submitted on 9/21/2023)  What is the primary reason for your visit?: Other